# Patient Record
Sex: FEMALE | Race: WHITE | NOT HISPANIC OR LATINO | Employment: FULL TIME | URBAN - METROPOLITAN AREA
[De-identification: names, ages, dates, MRNs, and addresses within clinical notes are randomized per-mention and may not be internally consistent; named-entity substitution may affect disease eponyms.]

---

## 2019-11-20 ENCOUNTER — TELEPHONE (OUTPATIENT)
Dept: SCHEDULING | Facility: IMAGING CENTER | Age: 34
End: 2019-11-20

## 2019-12-31 ENCOUNTER — APPOINTMENT (OUTPATIENT)
Dept: MEDICAL GROUP | Facility: MEDICAL CENTER | Age: 34
End: 2019-12-31
Payer: COMMERCIAL

## 2020-01-24 ENCOUNTER — OFFICE VISIT (OUTPATIENT)
Dept: MEDICAL GROUP | Facility: IMAGING CENTER | Age: 35
End: 2020-01-24
Payer: COMMERCIAL

## 2020-01-24 VITALS
DIASTOLIC BLOOD PRESSURE: 70 MMHG | BODY MASS INDEX: 24.63 KG/M2 | TEMPERATURE: 98 F | HEIGHT: 63 IN | OXYGEN SATURATION: 99 % | HEART RATE: 84 BPM | SYSTOLIC BLOOD PRESSURE: 110 MMHG | RESPIRATION RATE: 12 BRPM | WEIGHT: 139 LBS

## 2020-01-24 DIAGNOSIS — Z13.220 SCREENING FOR HYPERLIPIDEMIA: ICD-10-CM

## 2020-01-24 DIAGNOSIS — Z80.8 FAMILY HISTORY OF THYROID CANCER: ICD-10-CM

## 2020-01-24 DIAGNOSIS — Z76.89 ENCOUNTER TO ESTABLISH CARE WITH NEW DOCTOR: ICD-10-CM

## 2020-01-24 DIAGNOSIS — Z13.1 SCREENING FOR DIABETES MELLITUS: ICD-10-CM

## 2020-01-24 DIAGNOSIS — F33.41 RECURRENT MAJOR DEPRESSIVE DISORDER, IN PARTIAL REMISSION (HCC): ICD-10-CM

## 2020-01-24 DIAGNOSIS — F43.29 STRESS AND ADJUSTMENT REACTION: Primary | ICD-10-CM

## 2020-01-24 DIAGNOSIS — Z13.0 SCREENING FOR DEFICIENCY ANEMIA: ICD-10-CM

## 2020-01-24 PROCEDURE — 99203 OFFICE O/P NEW LOW 30 MIN: CPT | Performed by: NURSE PRACTITIONER

## 2020-01-24 SDOH — HEALTH STABILITY: MENTAL HEALTH: HOW MANY STANDARD DRINKS CONTAINING ALCOHOL DO YOU HAVE ON A TYPICAL DAY?: 1 OR 2

## 2020-01-24 SDOH — HEALTH STABILITY: MENTAL HEALTH
STRESS IS WHEN SOMEONE FEELS TENSE, NERVOUS, ANXIOUS, OR CAN'T SLEEP AT NIGHT BECAUSE THEIR MIND IS TROUBLED. HOW STRESSED ARE YOU?: RATHER MUCH

## 2020-01-24 SDOH — HEALTH STABILITY: PHYSICAL HEALTH: ON AVERAGE, HOW MANY MINUTES DO YOU ENGAGE IN EXERCISE AT THIS LEVEL?: 40 MIN

## 2020-01-24 SDOH — HEALTH STABILITY: MENTAL HEALTH: HOW OFTEN DO YOU HAVE 6 OR MORE DRINKS ON ONE OCCASION?: LESS THAN MONTHLY

## 2020-01-24 SDOH — HEALTH STABILITY: MENTAL HEALTH: HOW OFTEN DO YOU HAVE A DRINK CONTAINING ALCOHOL?: 4 OR MORE TIMES A WEEK

## 2020-01-24 SDOH — HEALTH STABILITY: PHYSICAL HEALTH: ON AVERAGE, HOW MANY DAYS PER WEEK DO YOU ENGAGE IN MODERATE TO STRENUOUS EXERCISE (LIKE A BRISK WALK)?: 1 DAY

## 2020-01-24 ASSESSMENT — PAIN SCALES - GENERAL: PAINLEVEL: NO PAIN

## 2020-01-24 ASSESSMENT — PATIENT HEALTH QUESTIONNAIRE - PHQ9
CLINICAL INTERPRETATION OF PHQ2 SCORE: 2
SUM OF ALL RESPONSES TO PHQ QUESTIONS 1-9: 4
5. POOR APPETITE OR OVEREATING: 1 - SEVERAL DAYS

## 2020-01-26 PROBLEM — F43.29 STRESS AND ADJUSTMENT REACTION: Status: ACTIVE | Noted: 2020-01-26

## 2020-01-26 PROBLEM — F32.A DEPRESSION: Status: ACTIVE | Noted: 2020-01-26

## 2020-01-26 NOTE — ASSESSMENT & PLAN NOTE
States that she recently moved back to [place] from [place] with her fiance. States just started a new job that she is settling into and she is getting  in March 2020 in [place], [place] where her  is from. States that she experiencing stress that is related to moving, starting a new job, and planning her wedding. States she has been trying to increase her exercise level to manage stress. States that she walks her dog daily, and trying to get to the gym once a week.

## 2020-01-26 NOTE — PROGRESS NOTES
Subjective:   CC: Depression    HISTORY OF THE PRESENT ILLNESS: Patient is a 34 y.o. female. Her prior PCP was in [place], [place], uncertain the last time she saw provider.  Patient has history of depression and family thyroid cancer. Patient is here today to establish care and discuss overall health.     Depression  States she has a history of depression. States in the past she has been on Lexapro. States she lost her first  about 5 years ago, and this was when she started Lexapro. Denies depressive mood at this time that requires her to resume medication. Denies current counseling. Denies suicidal/homicidal thoughts at this time.     Stress and adjustment reaction  States that she recently moved back to [place] from [place] with her fiance. States just started a new job that she is settling into and she is getting  in March 2020 in [place], [place] where her  is from. States that she experiencing stress that is related to moving, starting a new job, and planning her wedding. States she has been trying to increase her exercise level to manage stress. States that she walks her dog daily, and trying to get to the gym once a week.     Depression Screening  Little interest or pleasure in doing things?  1 - several days   Feeling down, depressed , or hopeless? 1 - several days   Trouble falling or staying asleep, or sleeping too much?  0 - not at all   Feeling tired or having little energy?  0 - not at all   Poor appetite or overeating?  1 - several days   Feeling bad about yourself - or that you are a failure or have let yourself or your family down? 1 - several days   Trouble concentrating on things, such as reading the newspaper or watching television? 0 - not at all   Moving or speaking so slowly that other people could have noticed.  Or the opposite - being so fidgety or restless that you have been moving around a lot more than usual?  0 - not at all   Thoughts that you would be better off dead, or of  hurting yourself?  0 - not at all   Patient Health Questionnaire Score: 4     If depressive symptoms identified deferred to follow up visit unless specifically addressed in assesment and plan.    Interpretation of PHQ-9 Total Score   Score Severity   1-4 No Depression   5-9 Mild Depression   10-14 Moderate Depression   15-19 Moderately Severe Depression   20-27 Severe Depression    Allergies: Patient has no known allergies.    Current Outpatient Medications Ordered in Epic   Medication Sig Dispense Refill   • NON SPECIFIED      • Norgestim-Eth Estrad Triphasic (ORTHO TRI-CYCLEN, 28, PO) Take  by mouth.       No current Epic-ordered facility-administered medications on file.      Past Medical History:   Diagnosis Date   • Depression      History reviewed. No pertinent surgical history.    Social History     Tobacco Use   • Smoking status: Never Smoker   • Smokeless tobacco: Never Used   Substance Use Topics   • Alcohol use: Yes     Frequency: 4 or more times a week     Drinks per session: 1 or 2     Binge frequency: Less than monthly   • Drug use: Yes     Types: Marijuana, Inhaled, Oral     Social History     Patient does not qualify to have social determinant information on file (likely too young).   Social History Narrative   • Not on file     Family History   Problem Relation Age of Onset   • Cancer Mother         thyroid   • Cancer Paternal Aunt         breast   • Lung Disease Maternal Grandfather    • Stroke Paternal Grandmother      Health Maintenance: Completed.    ROS:   Constitutional: Denies fever, chills, night sweats, weight loss/gain or malaise/fatigue.   HENT: Denies nasal congestion, sore throat, hearing loss, enlarged thyroid, or difficulty swallowing.    Eyes: Denies changes in vision, pain.   Respiratory: Denies cough, SOB at rest or activity.    Cardiovascular: Denies tachycardia, chest pain, palpitations, or  leg swelling.   Gastrointestinal: Denies N/V/C/D, abdominal pain, loss appetite, reflux,  "or hematochezia.  Genitourinary: Denies difficulty voiding, dysuria, nocturia, or hematuria.   Skin: Negative for rash or worrisome moles.   Neurological: Negative for dizziness, focal weakness and headaches.   Endo/Heme/Allergies: Denies bruise/bleed easily, allergies.   Psychiatric/Behavioral: Denies nervous/anxious or difficulty sleeping. Hx of depression.     Objective:   Exam: /70   Pulse 84   Temp 36.7 °C (98 °F)   Resp 12   Ht 1.6 m (5' 3\")   Wt 63 kg (139 lb)   SpO2 99%  Body mass index is 24.62 kg/m².    General: Normal appearing. No distress.  HEENT: Normocephalic. Eyes conjunctiva clear lids without ptosis, PERRLA, ears normal shape and contour, canals are clear bilaterally, tympanic membranes pearly gray, intact, non-bulging, no drainage noted, no turbinate erythema, no polyps visible, oropharynx is without erythema, edema or exudates.  Teeth intact.  Neck: Supple without JVD or abnormal masses. Small soft mobile thyroid palpated, no nodules palpated, non-tender.  Pulmonary: Clear to ausculation.  Normal effort. No rales, ronchi, or wheezing.  Cardiovascular: Regular rate and rhythm without murmur. Pedal and radial pulses are intact and equal bilaterally.  Abdomen: Soft, nontender, nondistended. Normal bowel sounds. Liver and spleen are not palpable.  Lymph: No cervical or supraclavicular lymph nodes are palpable  Skin: Warm and dry.  No obvious lesions.  Musculoskeletal: Normal gait. No extremity cyanosis, clubbing, or edema.  Psych: Normal mood and affect. Alert and oriented x3. Judgment and insight is normal.    Assessment & Plan:   1. Encounter to establish care with new doctor  2. Screening for diabetes mellitus  3. Screening for hyperlipidemia  4. Screening for deficiency anemia  Reviewed with patient medication use and side effects. Medical, past, surgical history reviewed with patient. Discussed with patient the risk and benefits of receiving vaccines. Discussed CDC recommendations " for immunizations and USPSTF guidelines for screening exams.  Verbalized understanding. States she is UTD on vaccines, will provide proof in near future.  Encouraged patient to wash hands regularly and avoid sick contacts while supporting immune system with Vitamin C, Zinc, Elderberry, and garlic. Discussed preventive screening labs with patient, verbalized understanding.  Medical release signed for previous medical records.    - Comp Metabolic Panel; Future  - LIPID PANEL  - CBC WITH DIFFERENTIAL; Future    5. Family history of thyroid cancer  Discussed checking thyroid lab due to family history and patient request.   - TSH WITH REFLEX TO FT4; Future    6. Recurrent major depressive disorder, in partial remission (HCC)  7. Stress and adjustment reaction  This is a chronic stable condition.  Discussed with patient PHQ 9 results, verbalized understanding.  Discussed with patient continuing to manage depressive mood and ongoing stress with increase of mindfulness, meditation, increased exercise level.  Discussed setting small goals to be obtained either daily or weekly to manage stress that is associated with life factors.  Discussed with patient possible benefit of counseling, declined at this time but will consider for future if stress or depressive mood increases.  Discussed with patient decreasing alcohol intake, verbalized understanding and willingness.  Discussed seeking emergency services if she experiences worse symptoms.    Return in about 2 months (around 3/24/2020).    Please note that this dictation was created using voice recognition software. I have made every reasonable attempt to correct obvious errors, but I expect that there are errors of grammar and possibly content that I did not discover before finalizing the note.

## 2020-01-26 NOTE — ASSESSMENT & PLAN NOTE
States she has a history of depression. States in the past she has been on Lexapro. States she lost her first  about 5 years ago, and this was when she started Lexapro. Denies depressive mood at this time that requires her to resume medication. Denies current counseling. Denies suicidal/homicidal thoughts at this time.

## 2020-01-29 ENCOUNTER — HOSPITAL ENCOUNTER (OUTPATIENT)
Dept: LAB | Facility: MEDICAL CENTER | Age: 35
End: 2020-01-29
Attending: NURSE PRACTITIONER
Payer: COMMERCIAL

## 2020-01-29 DIAGNOSIS — Z13.1 SCREENING FOR DIABETES MELLITUS: ICD-10-CM

## 2020-01-29 DIAGNOSIS — Z80.8 FAMILY HISTORY OF THYROID CANCER: ICD-10-CM

## 2020-01-29 DIAGNOSIS — Z13.0 SCREENING FOR DEFICIENCY ANEMIA: ICD-10-CM

## 2020-01-29 LAB
ALBUMIN SERPL BCP-MCNC: 4.7 G/DL (ref 3.2–4.9)
ALBUMIN/GLOB SERPL: 1.6 G/DL
ALP SERPL-CCNC: 37 U/L (ref 30–99)
ALT SERPL-CCNC: 11 U/L (ref 2–50)
ANION GAP SERPL CALC-SCNC: 11 MMOL/L (ref 0–11.9)
AST SERPL-CCNC: 12 U/L (ref 12–45)
BASOPHILS # BLD AUTO: 2.2 % (ref 0–1.8)
BASOPHILS # BLD: 0.08 K/UL (ref 0–0.12)
BILIRUB SERPL-MCNC: 0.4 MG/DL (ref 0.1–1.5)
BUN SERPL-MCNC: 8 MG/DL (ref 8–22)
CALCIUM SERPL-MCNC: 9.8 MG/DL (ref 8.5–10.5)
CHLORIDE SERPL-SCNC: 105 MMOL/L (ref 96–112)
CHOLEST SERPL-MCNC: 173 MG/DL (ref 100–199)
CO2 SERPL-SCNC: 25 MMOL/L (ref 20–33)
CREAT SERPL-MCNC: 0.8 MG/DL (ref 0.5–1.4)
EOSINOPHIL # BLD AUTO: 0.06 K/UL (ref 0–0.51)
EOSINOPHIL NFR BLD: 1.7 % (ref 0–6.9)
ERYTHROCYTE [DISTWIDTH] IN BLOOD BY AUTOMATED COUNT: 47.3 FL (ref 35.9–50)
GLOBULIN SER CALC-MCNC: 2.9 G/DL (ref 1.9–3.5)
GLUCOSE SERPL-MCNC: 94 MG/DL (ref 65–99)
HCT VFR BLD AUTO: 43.7 % (ref 37–47)
HDLC SERPL-MCNC: 82 MG/DL
HGB BLD-MCNC: 13.6 G/DL (ref 12–16)
IMM GRANULOCYTES # BLD AUTO: 0.01 K/UL (ref 0–0.11)
IMM GRANULOCYTES NFR BLD AUTO: 0.3 % (ref 0–0.9)
LDLC SERPL CALC-MCNC: 79 MG/DL
LYMPHOCYTES # BLD AUTO: 1.5 K/UL (ref 1–4.8)
LYMPHOCYTES NFR BLD: 41.8 % (ref 22–41)
MCH RBC QN AUTO: 31.9 PG (ref 27–33)
MCHC RBC AUTO-ENTMCNC: 31.1 G/DL (ref 33.6–35)
MCV RBC AUTO: 102.3 FL (ref 81.4–97.8)
MONOCYTES # BLD AUTO: 0.28 K/UL (ref 0–0.85)
MONOCYTES NFR BLD AUTO: 7.8 % (ref 0–13.4)
NEUTROPHILS # BLD AUTO: 1.66 K/UL (ref 2–7.15)
NEUTROPHILS NFR BLD: 46.2 % (ref 44–72)
NRBC # BLD AUTO: 0 K/UL
NRBC BLD-RTO: 0 /100 WBC
PLATELET # BLD AUTO: 277 K/UL (ref 164–446)
PMV BLD AUTO: 10.3 FL (ref 9–12.9)
POTASSIUM SERPL-SCNC: 4.1 MMOL/L (ref 3.6–5.5)
PROT SERPL-MCNC: 7.6 G/DL (ref 6–8.2)
RBC # BLD AUTO: 4.27 M/UL (ref 4.2–5.4)
SODIUM SERPL-SCNC: 141 MMOL/L (ref 135–145)
TRIGL SERPL-MCNC: 59 MG/DL (ref 0–149)
WBC # BLD AUTO: 3.6 K/UL (ref 4.8–10.8)

## 2020-01-29 PROCEDURE — 80053 COMPREHEN METABOLIC PANEL: CPT

## 2020-01-29 PROCEDURE — 85025 COMPLETE CBC W/AUTO DIFF WBC: CPT

## 2020-01-29 PROCEDURE — 80061 LIPID PANEL: CPT

## 2020-01-29 PROCEDURE — 84443 ASSAY THYROID STIM HORMONE: CPT

## 2020-01-29 PROCEDURE — 36415 COLL VENOUS BLD VENIPUNCTURE: CPT

## 2020-01-30 LAB — TSH SERPL DL<=0.005 MIU/L-ACNC: 1.26 UIU/ML (ref 0.38–5.33)

## 2020-01-31 DIAGNOSIS — D75.89 MACROCYTOSIS WITHOUT ANEMIA: ICD-10-CM

## 2020-05-29 ENCOUNTER — HOSPITAL ENCOUNTER (OUTPATIENT)
Dept: LAB | Facility: MEDICAL CENTER | Age: 35
End: 2020-05-29
Attending: NURSE PRACTITIONER
Payer: COMMERCIAL

## 2020-05-29 ENCOUNTER — OFFICE VISIT (OUTPATIENT)
Dept: MEDICAL GROUP | Facility: IMAGING CENTER | Age: 35
End: 2020-05-29
Payer: COMMERCIAL

## 2020-05-29 VITALS
WEIGHT: 135 LBS | OXYGEN SATURATION: 97 % | TEMPERATURE: 98.3 F | HEART RATE: 67 BPM | RESPIRATION RATE: 12 BRPM | SYSTOLIC BLOOD PRESSURE: 112 MMHG | BODY MASS INDEX: 23.92 KG/M2 | DIASTOLIC BLOOD PRESSURE: 72 MMHG | HEIGHT: 63 IN

## 2020-05-29 DIAGNOSIS — R10.31 RIGHT LOWER QUADRANT ABDOMINAL PAIN: ICD-10-CM

## 2020-05-29 DIAGNOSIS — Z30.431 ENCOUNTER FOR MANAGEMENT OF INTRAUTERINE CONTRACEPTIVE DEVICE (IUD), UNSPECIFIED IUD MANAGEMENT TYPE: ICD-10-CM

## 2020-05-29 DIAGNOSIS — Z30.430 ENCOUNTER FOR INSERTION OF INTRAUTERINE CONTRACEPTIVE DEVICE (IUD): ICD-10-CM

## 2020-05-29 LAB
BASOPHILS # BLD AUTO: 0.6 % (ref 0–1.8)
BASOPHILS # BLD: 0.05 K/UL (ref 0–0.12)
EOSINOPHIL # BLD AUTO: 0.05 K/UL (ref 0–0.51)
EOSINOPHIL NFR BLD: 0.6 % (ref 0–6.9)
ERYTHROCYTE [DISTWIDTH] IN BLOOD BY AUTOMATED COUNT: 42.8 FL (ref 35.9–50)
HCT VFR BLD AUTO: 39.7 % (ref 37–47)
HGB BLD-MCNC: 13.2 G/DL (ref 12–16)
IMM GRANULOCYTES # BLD AUTO: 0.01 K/UL (ref 0–0.11)
IMM GRANULOCYTES NFR BLD AUTO: 0.1 % (ref 0–0.9)
LYMPHOCYTES # BLD AUTO: 1.82 K/UL (ref 1–4.8)
LYMPHOCYTES NFR BLD: 22.5 % (ref 22–41)
MCH RBC QN AUTO: 31.9 PG (ref 27–33)
MCHC RBC AUTO-ENTMCNC: 33.2 G/DL (ref 33.6–35)
MCV RBC AUTO: 95.9 FL (ref 81.4–97.8)
MONOCYTES # BLD AUTO: 0.47 K/UL (ref 0–0.85)
MONOCYTES NFR BLD AUTO: 5.8 % (ref 0–13.4)
NEUTROPHILS # BLD AUTO: 5.69 K/UL (ref 2–7.15)
NEUTROPHILS NFR BLD: 70.4 % (ref 44–72)
NRBC # BLD AUTO: 0 K/UL
NRBC BLD-RTO: 0 /100 WBC
PLATELET # BLD AUTO: 309 K/UL (ref 164–446)
PMV BLD AUTO: 10.8 FL (ref 9–12.9)
RBC # BLD AUTO: 4.14 M/UL (ref 4.2–5.4)
WBC # BLD AUTO: 8.1 K/UL (ref 4.8–10.8)

## 2020-05-29 PROCEDURE — 99213 OFFICE O/P EST LOW 20 MIN: CPT | Performed by: NURSE PRACTITIONER

## 2020-05-29 PROCEDURE — 85025 COMPLETE CBC W/AUTO DIFF WBC: CPT

## 2020-05-29 PROCEDURE — 84703 CHORIONIC GONADOTROPIN ASSAY: CPT

## 2020-05-29 PROCEDURE — 36415 COLL VENOUS BLD VENIPUNCTURE: CPT

## 2020-05-29 ASSESSMENT — FIBROSIS 4 INDEX: FIB4 SCORE: 0.46

## 2020-05-29 ASSESSMENT — PAIN SCALES - GENERAL: PAINLEVEL: 6=MODERATE PAIN

## 2020-05-30 LAB — HCG SERPL QL: NEGATIVE

## 2020-06-01 ENCOUNTER — HOSPITAL ENCOUNTER (OUTPATIENT)
Dept: RADIOLOGY | Facility: MEDICAL CENTER | Age: 35
End: 2020-06-01
Attending: NURSE PRACTITIONER
Payer: COMMERCIAL

## 2020-06-01 DIAGNOSIS — R10.31 RIGHT LOWER QUADRANT ABDOMINAL PAIN: ICD-10-CM

## 2020-06-01 PROCEDURE — 76830 TRANSVAGINAL US NON-OB: CPT

## 2020-06-01 NOTE — PROGRESS NOTES
Subjective:   CC: Pain (side pain. pain scale 6/10. constant pain. taknig aleve for pain.) and Other (no ob/gyn. wnats to get off BC and possibly get an IUD. )    HPI:   Lissette presents today with ongoing intermittent RLQ pain    States on May 20th she started to have intermittent RLQ pain that wraps around hip to lower back that is relieved with OTC Aleve. States that pain is dull and achy to sharp at times. Denies any UTI symptoms, N/V/C/D, fever, body aches, and/or chills at this time. Denies history of ectopic pregnancy. States she take her oral birth control everyday and does not believe she could be pregnant due to regular use of birth control. States she has been on her birth control for many years without complication. Denies any injury or new exercise regimen that could cause pain. States her gallbladder and appendix remained intact.  Denies pain being associated with eating.  States that she should start her menstrual cycle this coming Sunday.     Past Medical History:   Diagnosis Date   • Depression      Social History     Tobacco Use   • Smoking status: Never Smoker   • Smokeless tobacco: Never Used   Substance Use Topics   • Alcohol use: Yes     Frequency: 4 or more times a week     Drinks per session: 1 or 2     Binge frequency: Less than monthly   • Drug use: Yes     Types: Marijuana, Inhaled, Oral     Current Outpatient Medications Ordered in Epic   Medication Sig Dispense Refill   • NON SPECIFIED      • Norgestim-Eth Estrad Triphasic (ORTHO TRI-CYCLEN, 28, PO) Take  by mouth.       No current Epic-ordered facility-administered medications on file.      Allergies:  Patient has no known allergies.    ROS:  Constitutional: Denies fever, chills, night sweats, weight loss/gain or malaise/fatigue.   HENT: Denies nasal congestion, sore throat, hearing loss, enlarged thyroid, or difficulty swallowing.    Eyes: Denies changes in vision, pain.   Respiratory: Denies cough, SOB at rest or activity.   "  Cardiovascular: Denies tachycardia, chest pain, palpitations, or  leg swelling.   Gastrointestinal: Denies N/V/C/D, loss appetite, reflux, or hematochezia. Intermittent RLQ pain, see HPI.  Genitourinary: Denies difficulty voiding, dysuria, nocturia, or hematuria.   Skin: Negative for rash or worrisome moles.   Neurological: Negative for dizziness, focal weakness and headaches.   Endo/Heme/Allergies: Denies bruise/bleed easily, allergies.   Psychiatric/Behavioral: Denies nervous/anxious or difficulty sleeping. Hx of depression.    Objective:   Exam:  /72 (BP Location: Right arm, Patient Position: Sitting, BP Cuff Size: Adult)   Pulse 67   Temp 36.8 °C (98.3 °F) (Temporal)   Resp 12   Ht 1.6 m (5' 3\")   Wt 61.2 kg (135 lb)   SpO2 97%   BMI 23.91 kg/m²  Body mass index is 23.91 kg/m².  General: Normal appearing. No distress.  HEENT: Normocephalic. Eyes conjunctiva clear lids without ptosis, PERRLA, ears normal shape and contour. Patient wore a mask during visit.  Neck: Trachea midline, no masses, no thyromegaly.  Pulmonary: Clear to ausculation.  Normal effort. No rales, ronchi, or wheezing.  Cardiovascular: Regular rate and rhythm without murmur. Pedal and radial pulses are intact and equal bilaterally.  Abdomen: Soft, nondistended. Normal bowel sounds. Liver and spleen are not palpable. Mild tenderness with light palpation of right lower quadrant. Negative Rovsing's sign, McBurney's Point, and Psoas sign.  Neurologic: Grossly non-focal.  Skin: Warm and dry. No obvious lesions.  Musculoskeletal: Normal gait. No extremity cyanosis, clubbing, or edema.  Psych: Normal mood and affect. Alert and oriented x3. Judgment and insight is normal.    Assessment & Plan:   1. Encounter for management of intrauterine contraceptive device (IUD), unspecified IUD management type  Discussed referral to OB/GYN for counseling of IUD as form of birthcontrol, verbalized understanding and willingness.    - REFERRAL TO " OB/GYN    2. Right lower quadrant abdominal pain  This is a stable condition. Discussed with patient that physical exam is negative for acute appendicitis at this time. Discussed symptoms of appendicitis and ectopic pregnancy. Discussed the importance of ruling out ectopic pregnancy, verbalized understanding. Discussed with patient obtaining ultrasound of abdomen and transvaginal ultrasound to evaluate further for RLQ pain, verbalized understanding. Discussed with patient that she could be experiencing an ovarian cyst due to ongoing hormone birth control use. Instructed to continue to use Aleve as tolerated for pain relief. Instructed to take with food to decrease GI upset and bleeding. Instructed to use warm compress or heating pad to relieve pain.  Discussed seeking emergency services if she experiences worse symptoms or pain becomes constant. Instructed to obtain labs for further evaluation, verbalized understanding and willingness. Will evaluate plan of care once labs and ultrasounds are obtained.    - US-PELVIC TRANSVAGINAL ONLY; Future  - US-ABDOMEN COMPLETE SURVEY; Future  - CBC WITH DIFFERENTIAL; Future  -HCG Qual Serum    Patient was seen for 15 minutes face to face of which, at least 50% of the time was spent counseling regarding above.    Return if symptoms worsen or fail to improve. Pending labs and U/S results.    Please note that this dictation was created using voice recognition software. I have made every reasonable attempt to correct obvious errors, but I expect that there are errors of grammar and possibly content that I did not discover before finalizing the note.

## 2020-06-03 ENCOUNTER — HOSPITAL ENCOUNTER (OUTPATIENT)
Dept: RADIOLOGY | Facility: MEDICAL CENTER | Age: 35
End: 2020-06-03
Attending: NURSE PRACTITIONER
Payer: COMMERCIAL

## 2020-06-03 DIAGNOSIS — R10.31 RIGHT LOWER QUADRANT ABDOMINAL PAIN: ICD-10-CM

## 2020-06-03 PROCEDURE — 76700 US EXAM ABDOM COMPLETE: CPT

## 2020-08-28 ENCOUNTER — OFFICE VISIT (OUTPATIENT)
Dept: MEDICAL GROUP | Facility: IMAGING CENTER | Age: 35
End: 2020-08-28
Payer: COMMERCIAL

## 2020-08-28 VITALS
RESPIRATION RATE: 12 BRPM | HEART RATE: 68 BPM | WEIGHT: 130 LBS | BODY MASS INDEX: 23.04 KG/M2 | TEMPERATURE: 98.5 F | DIASTOLIC BLOOD PRESSURE: 80 MMHG | HEIGHT: 63 IN | OXYGEN SATURATION: 100 % | SYSTOLIC BLOOD PRESSURE: 122 MMHG

## 2020-08-28 DIAGNOSIS — Z34.90 ENCOUNTER FOR PRENATAL CARE: ICD-10-CM

## 2020-08-28 DIAGNOSIS — Z32.01 POSITIVE URINE PREGNANCY TEST: ICD-10-CM

## 2020-08-28 LAB
INT CON NEG: NEGATIVE
INT CON POS: POSITIVE
POC URINE PREGNANCY TEST: POSITIVE

## 2020-08-28 PROCEDURE — 81025 URINE PREGNANCY TEST: CPT | Performed by: NURSE PRACTITIONER

## 2020-08-28 PROCEDURE — 99213 OFFICE O/P EST LOW 20 MIN: CPT | Performed by: NURSE PRACTITIONER

## 2020-08-28 RX ORDER — M-VIT,TX,IRON,MINS/CALC/FOLIC 27MG-0.4MG
1 TABLET ORAL DAILY
COMMUNITY
End: 2020-10-06

## 2020-08-28 ASSESSMENT — PAIN SCALES - GENERAL: PAINLEVEL: 2=MINIMAL-SLIGHT

## 2020-08-28 ASSESSMENT — FIBROSIS 4 INDEX: FIB4 SCORE: 0.42

## 2020-08-28 NOTE — PROGRESS NOTES
Subjective:   CC: Other (Positive pregnancy test at home. )    HPI:   Lissette presents today to discuss positive pregnancy test.    States she recently took a urine pregnancy test at home and it was positive. States she recently went to her GYN to discuss switching to an IUD for birth control, but decided to not switch, and actually get off birth control to try having a baby with her . States her LMP was July 22nd. [facility] she contacted her established doctor, Dr. Washburn, but was told he does not do delivers. [facility] she has an appointment with a new doctor in mid September at [facility]. States she is excited and didn't think she would get pregnancy so quickly. States she drank alcohol the weekend before knowing she was pregnant and is concerned this has affected her pregnancy. Denies drinking heavily during the weekend. States her mother has Factor Leiden 5 and she is a carrier. Denies history of DVT.    Past Medical History:   Diagnosis Date   • Depression      Social History     Tobacco Use   • Smoking status: Never Smoker   • Smokeless tobacco: Never Used   Substance Use Topics   • Alcohol use: Yes     Frequency: 4 or more times a week     Drinks per session: 1 or 2     Binge frequency: Less than monthly   • Drug use: Yes     Types: Marijuana, Inhaled, Oral     Current Outpatient Medications Ordered in Epic   Medication Sig Dispense Refill   • therapeutic multivitamin-minerals (THERAGRAN-M) Tab Take 1 Tab by mouth every day.     • NON SPECIFIED        No current Epic-ordered facility-administered medications on file.      Allergies:  Patient has no known allergies.    Health Maintenance: Completed.    ROS:  Constitutional: Denies fever, chills, night sweats, weight loss/gain or malaise/fatigue.   HENT: Denies nasal congestion, sore throat, hearing loss, enlarged thyroid, or difficulty swallowing.    Eyes: Denies changes in vision, pain.   Respiratory: Denies cough, SOB at rest or activity.  "   Cardiovascular: Denies tachycardia, chest pain, palpitations, or  leg swelling.   Gastrointestinal: Denies N/V/C/D,abdomen pain, loss appetite, reflux, or hematochezia.   Genitourinary: Denies difficulty voiding, dysuria, nocturia, or hematuria.   Skin: Negative for rash or worrisome moles.   Neurological: Negative for dizziness, focal weakness and headaches.   Endo/Heme/Allergies: Denies bruise/bleed easily, allergies.   Psychiatric/Behavioral: Denies nervous/anxious or difficulty sleeping. Hx of depression.    Objective:   Exam:  /80 (BP Location: Right arm, Patient Position: Sitting, BP Cuff Size: Adult)   Pulse 68   Temp 36.9 °C (98.5 °F) (Temporal)   Resp 12   Ht 1.6 m (5' 3\")   Wt 59 kg (130 lb)   LMP 07/22/2020   SpO2 100%   BMI 23.03 kg/m²  Body mass index is 23.03 kg/m².  General: Normal appearing. No distress.  HEENT: Normocephalic. Eyes conjunctiva clear lids without ptosis, PERRLA, ears normal shape and contour. Patient wore a mask during visit.  Neck: Trachea midline, no masses, no thyromegaly.  Pulmonary:  Unlabored respiratory effort, no cough.  Neurologic: Grossly non-focal.  Skin: Warm and dry. No obvious lesions.  Musculoskeletal: Normal gait. No extremity cyanosis, clubbing, or edema.  Psych: Normal mood and affect. Alert and oriented x3. Judgment and insight is normal.    Labs: POCT urine pregnancy test: positive.    Assessment & Plan:   1. Encounter for prenatal care  2. Positive urine pregnancy test  This is a stable condition. 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 Discussed PCP reaching out to OB/GYN to assess if patient needs to be on preventive medication to prevent clotting, verbalized understanding. Discussed with patient changing to prenatal vitamin instead of multiple vitamin. Discussed the benefit of 400 mcg of Folic Acid growing fetus. Verbalized understanding and willingness to change. " Discussed maintain hydration with water and maintain her current activity level and exercise regimen. Discussed avoiding any vigorous exercising, verbalized understanding. Discussed with patient not drinking alcohol and avoid cigarette smoke, verbalized understanding. Encouraged patient to wash hands regularly and avoid sick contacts while supporting immune system.  Discussed appropriate social distancing and isolation with patient to decrease risk of COVID-19, verbalized understanding.  Instructed to wear mask when in public places, verbalized understanding. Discussed before starting any medication reaching out to PCP and/or OB/GYN to verify if it is appropriate. Discussed if she experiences morning sick to attempt to snack often to avoid symptoms. Discussed if she experiences constipation starting OTC colace 100-200 mg, verbalized understanding. Discussed with patient maintaining established care appointment with new OB/GYN. Discussed with reaching out to PCP if she has any further questions before being able to establish care with OB, verbalized understanding.     -     POCT Pregnancy    Return As needed and/or has any question until she is available to establish care with OB.    Please note that this dictation was created using voice recognition software. I have made every reasonable attempt to correct obvious errors, but I expect that there are errors of grammar and possibly content that I did not discover before finalizing the note.

## 2020-09-22 ENCOUNTER — HOSPITAL ENCOUNTER (OUTPATIENT)
Facility: MEDICAL CENTER | Age: 35
End: 2020-09-22
Attending: OBSTETRICS & GYNECOLOGY
Payer: COMMERCIAL

## 2020-09-22 LAB
ABO GROUP BLD: NORMAL
RH BLD: NORMAL

## 2020-09-22 PROCEDURE — 86900 BLOOD TYPING SEROLOGIC ABO: CPT

## 2020-09-22 PROCEDURE — 86901 BLOOD TYPING SEROLOGIC RH(D): CPT

## 2020-10-06 ENCOUNTER — HOSPITAL ENCOUNTER (EMERGENCY)
Facility: MEDICAL CENTER | Age: 35
End: 2020-10-06
Attending: EMERGENCY MEDICINE
Payer: COMMERCIAL

## 2020-10-06 ENCOUNTER — APPOINTMENT (OUTPATIENT)
Dept: RADIOLOGY | Facility: MEDICAL CENTER | Age: 35
End: 2020-10-06
Attending: EMERGENCY MEDICINE
Payer: COMMERCIAL

## 2020-10-06 VITALS
OXYGEN SATURATION: 99 % | HEIGHT: 63 IN | RESPIRATION RATE: 18 BRPM | TEMPERATURE: 98.9 F | SYSTOLIC BLOOD PRESSURE: 118 MMHG | WEIGHT: 132.72 LBS | BODY MASS INDEX: 23.52 KG/M2 | DIASTOLIC BLOOD PRESSURE: 70 MMHG | HEART RATE: 82 BPM

## 2020-10-06 DIAGNOSIS — O03.4 INCOMPLETE MISCARRIAGE: ICD-10-CM

## 2020-10-06 LAB
ANION GAP SERPL CALC-SCNC: 14 MMOL/L (ref 7–16)
B-HCG SERPL-ACNC: 9890 MIU/ML (ref 0–10)
BASOPHILS # BLD AUTO: 1 % (ref 0–1.8)
BASOPHILS # BLD: 0.06 K/UL (ref 0–0.12)
BUN SERPL-MCNC: 8 MG/DL (ref 8–22)
CALCIUM SERPL-MCNC: 9.7 MG/DL (ref 8.4–10.2)
CHLORIDE SERPL-SCNC: 104 MMOL/L (ref 96–112)
CO2 SERPL-SCNC: 21 MMOL/L (ref 20–33)
CREAT SERPL-MCNC: 0.55 MG/DL (ref 0.5–1.4)
EOSINOPHIL # BLD AUTO: 0.09 K/UL (ref 0–0.51)
EOSINOPHIL NFR BLD: 1.4 % (ref 0–6.9)
ERYTHROCYTE [DISTWIDTH] IN BLOOD BY AUTOMATED COUNT: 40.7 FL (ref 35.9–50)
GLUCOSE SERPL-MCNC: 101 MG/DL (ref 65–99)
HCT VFR BLD AUTO: 37.3 % (ref 37–47)
HGB BLD-MCNC: 12.8 G/DL (ref 12–16)
IMM GRANULOCYTES # BLD AUTO: 0.01 K/UL (ref 0–0.11)
IMM GRANULOCYTES NFR BLD AUTO: 0.2 % (ref 0–0.9)
LYMPHOCYTES # BLD AUTO: 1.47 K/UL (ref 1–4.8)
LYMPHOCYTES NFR BLD: 23.4 % (ref 22–41)
MCH RBC QN AUTO: 30.8 PG (ref 27–33)
MCHC RBC AUTO-ENTMCNC: 34.3 G/DL (ref 33.6–35)
MCV RBC AUTO: 89.9 FL (ref 81.4–97.8)
MONOCYTES # BLD AUTO: 0.42 K/UL (ref 0–0.85)
MONOCYTES NFR BLD AUTO: 6.7 % (ref 0–13.4)
NEUTROPHILS # BLD AUTO: 4.22 K/UL (ref 2–7.15)
NEUTROPHILS NFR BLD: 67.3 % (ref 44–72)
NRBC # BLD AUTO: 0 K/UL
NRBC BLD-RTO: 0 /100 WBC
PLATELET # BLD AUTO: 323 K/UL (ref 164–446)
PMV BLD AUTO: 9.4 FL (ref 9–12.9)
POTASSIUM SERPL-SCNC: 3.9 MMOL/L (ref 3.6–5.5)
RBC # BLD AUTO: 4.15 M/UL (ref 4.2–5.4)
SODIUM SERPL-SCNC: 139 MMOL/L (ref 135–145)
WBC # BLD AUTO: 6.3 K/UL (ref 4.8–10.8)

## 2020-10-06 PROCEDURE — 85025 COMPLETE CBC W/AUTO DIFF WBC: CPT

## 2020-10-06 PROCEDURE — 99284 EMERGENCY DEPT VISIT MOD MDM: CPT

## 2020-10-06 PROCEDURE — 76801 OB US < 14 WKS SINGLE FETUS: CPT

## 2020-10-06 PROCEDURE — 80048 BASIC METABOLIC PNL TOTAL CA: CPT

## 2020-10-06 PROCEDURE — 84702 CHORIONIC GONADOTROPIN TEST: CPT

## 2020-10-06 PROCEDURE — 36415 COLL VENOUS BLD VENIPUNCTURE: CPT

## 2020-10-06 ASSESSMENT — FIBROSIS 4 INDEX: FIB4 SCORE: 0.42

## 2020-10-06 NOTE — ED NOTES
Pt given d/c paperwork and f/u instruction, pt verbalized understanding all information given. Pt ambulated out the ER w/o difficulty w/ family.

## 2020-10-06 NOTE — ED TRIAGE NOTES
"Chief Complaint   Patient presents with   • Vaginal Bleeding     started this am, 3 pads this am   • Pelvic Pain   • Pregnancy     Pt reports is 6 weeks pregnant     /84   Pulse 87   Temp 37.2 °C (98.9 °F) (Temporal)   Resp 18   Ht 1.6 m (5' 3\")   Wt 60.2 kg (132 lb 11.5 oz)   LMP 07/22/2020   SpO2 99%   BMI 23.51 kg/m²     Covid Screen Negative    "

## 2020-10-06 NOTE — ED NOTES
Pelvic exam performed by ERP w/ female ED Tech at bedside.  Privacy provided throughout procedure.  Pt provided w/ warm blankets.

## 2020-10-06 NOTE — Clinical Note
Amanda Fleiner was seen and treated in our emergency department on 10/6/2020.  She may return to work on 10/07/2020.       If you have any questions or concerns, please don't hesitate to call.      Nick Garcia M.D.

## 2020-10-06 NOTE — ED PROVIDER NOTES
ED Provider Note      Means of Arrival: Private vehicle  History obtained from: Patient, medical record      CHIEF COMPLAINT  Chief Complaint   Patient presents with   • Vaginal Bleeding     started this am, 3 pads this am   • Pelvic Pain   • Pregnancy     Pt reports is 6 weeks pregnant       HPI  Amanda Jeanne Fleiner is a 35 y.o. female who presents with vaginal bleeding, abdominal cramping.  The patient is G1, P0 at approximately 6 weeks.  She sees Dr. Gudino for OB/GYN.  She has had several ultrasounds concerning for threatened miscarriage.  She has a prescription available for misoprostol but has not used it.  She began having increased vaginal bleeding and abdominal cramping this morning, which caused her concern.  She denies any lightheadedness, dysuria, hematuria, chest pain, shortness of breath, fevers.    REVIEW OF SYSTEMS  CONSTITUTIONAL:  No fever.  CARDIOVASCULAR:  No chest discomfort.  RESPIRATORY:  No pleuritic chest pain.  GASTROINTESTINAL:   See HPI  GENITOURINARY:   No dysuria.  MUSCULOSKELETAL:  No arthralgia.  SKIN:  No rash or suspicious lesions.  NEUROLOGIC:   No headache.  ENDOCRINE:  No facial edema.  HEMATOLOGIC:  No abnormal bleeding.       See HPI for further details.   All other systems are negative.     PAST MEDICAL HISTORY  Past Medical History:   Diagnosis Date   • Depression        FAMILY HISTORY  Family History   Problem Relation Age of Onset   • Cancer Mother         thyroid   • Cancer Paternal Aunt         breast   • Lung Disease Maternal Grandfather    • Stroke Paternal Grandmother        SOCIAL HISTORY   reports that she has never smoked. She has never used smokeless tobacco. She reports current alcohol use. She reports current drug use. Drugs: Marijuana, Inhaled, and Oral.    SURGICAL HISTORY  History reviewed. No pertinent surgical history.    CURRENT MEDICATIONS  Home Medications     Reviewed by Nolan Rosdao (Pharmacy Tech) on 10/06/20 at 1131  Med List Status:  "Complete   Medication Last Dose Status        Patient Javier Taking any Medications                       ALLERGIES  No Known Allergies    PHYSICAL EXAM  VITAL SIGNS: /62   Pulse 70   Temp 37.2 °C (98.9 °F) (Temporal)   Resp 18   Ht 1.6 m (5' 3\")   Wt 60.2 kg (132 lb 11.5 oz)   LMP 2020   SpO2 97%   BMI 23.51 kg/m²    Gen: Alert  HENT: ATNC  Eyes: Normal conjunctiva  Neck: trachea midline  Resp: no respiratory distress  CV: No JVD, regular rate and rhythm  Abd: non-distended, nontender  : No CVA tenderness  Pelvic: Open cervical loss with clot present.  No active bleeding.  Ext: No deformities  Psych: normal mood  Neuro: speech fluent       RADIOLOGY/PROCEDURES  US-OB 1ST TRIMESTER WITH TRANSVAGINAL (COMBO)   Final Result      Viable single intrauterine gestation of an estimated  in progress with dysmorphic gestational sac positioned in the endocervical canal.      No sonographic evidence of ovarian torsion          LABS  Labs Reviewed   CBC WITH DIFFERENTIAL - Abnormal; Notable for the following components:       Result Value    RBC 4.15 (*)     All other components within normal limits   BASIC METABOLIC PANEL - Abnormal; Notable for the following components:    Glucose 101 (*)     All other components within normal limits   HCG QUANTITATIVE - Abnormal; Notable for the following components:    Bhcg 9890.0 (*)     All other components within normal limits   ESTIMATED GFR          COURSE & MEDICAL DECISION MAKING  Pertinent Labs & Imaging studies reviewed. (See chart for details)    Patient resents with inevitable miscarriage.  She has an open office with bleeding.  Ultrasound shows that she is still in the process of this.  Patient was counseled on return precautions, to [facility] guidance.  No evidence of septic .  She is advised to fill her misoprostol and use this at home.  She is otherwise well-appearing.  No evidence of anemia.  No evidence of significant blood loss.  Review " of the patient's medical record demonstrates that she is Rh+.    Appropriate PPE were worn at this encounter.     FINAL IMPRESSION  1. Incomplete miscarriage             DISPOSITION:  Patient will be discharged home in stable condition.    FOLLOW UP:  Jody Gudino M.D.  [address]  [address]  [place] [id number] [phone]    Call       [facility], Emergency Dept  [address]  [place] [address] [phone]    If symptoms worsen      OUTPATIENT MEDICATIONS:  New Prescriptions    No medications on file

## 2020-10-06 NOTE — ED NOTES
Med Rec complete per Pt at bedside  Allergies Reviewed  No ABX in the last 14 days    Pt takes no medications

## 2020-10-06 NOTE — DISCHARGE INSTRUCTIONS
You are seen in the emergency department for vaginal bleeding in the setting of miscarriage.    Your blood work is reassuring.  Your ultrasound shows that you are still in the process of miscarrying.  We recommend that you  your misoprostol and use that.    Please follow-up with your OB/GYN.    For pain you can take ibuprofen (Motrin), 600mg every 6 hours as needed for pain (take with food to avoid GI upset). You can also take acetaminophen (Tylenol), 1000mg every 8 hours as needed for pain. Do not take more than 3000mg of acetaminophen in any 24 hour period.  Taking these medications regularly during the day can be very effective in controlling pain.    Please return to the emergency department or seek medical attention if you develop:  Fevers, excessive pain, soaking more than 2 pads an hour for several hours, lightheadedness, or any other new or concerning findings    ================================  Coronavirus Information    Your visit did NOT appear to relate to coronavirus, but if you or your family develop symptoms that concern you for coronavirus (please see CDC website for symptoms), please contact the [facility] [place], [place] hotline (or your local health department)  or your healthcare provider before going to a medical facility:    [facility] [place] [place]  24hr hotline: [phone]    Information is available from the Centers for Disease Control and Prevention  www.CDC.gov    and     [facility] [place] [place]  [email or web address]    If you are severely ill or having a hard time breathing, please immediatly seek medical care. Notify the  or Emergency Department Triage about your symptoms.

## 2020-10-16 ENCOUNTER — IMMUNIZATION (OUTPATIENT)
Dept: SOCIAL WORK | Facility: CLINIC | Age: 35
End: 2020-10-16
Payer: COMMERCIAL

## 2020-10-16 DIAGNOSIS — Z23 NEED FOR VACCINATION: Primary | ICD-10-CM

## 2020-10-16 PROCEDURE — 90686 IIV4 VACC NO PRSV 0.5 ML IM: CPT | Performed by: REGISTERED NURSE

## 2020-10-16 PROCEDURE — 90471 IMMUNIZATION ADMIN: CPT | Performed by: REGISTERED NURSE

## 2021-04-16 ENCOUNTER — OFFICE VISIT (OUTPATIENT)
Dept: MEDICAL GROUP | Facility: IMAGING CENTER | Age: 36
End: 2021-04-16
Payer: COMMERCIAL

## 2021-04-16 VITALS
HEIGHT: 63 IN | OXYGEN SATURATION: 98 % | HEART RATE: 80 BPM | WEIGHT: 140 LBS | BODY MASS INDEX: 24.8 KG/M2 | RESPIRATION RATE: 12 BRPM | SYSTOLIC BLOOD PRESSURE: 100 MMHG | TEMPERATURE: 98.1 F | DIASTOLIC BLOOD PRESSURE: 64 MMHG

## 2021-04-16 DIAGNOSIS — F41.9 ANXIETY: ICD-10-CM

## 2021-04-16 DIAGNOSIS — F33.1 MODERATE EPISODE OF RECURRENT MAJOR DEPRESSIVE DISORDER (HCC): ICD-10-CM

## 2021-04-16 PROBLEM — M76.32 ILIOTIBIAL BAND SYNDROME OF LEFT SIDE: Status: ACTIVE | Noted: 2021-03-02

## 2021-04-16 PROCEDURE — 99213 OFFICE O/P EST LOW 20 MIN: CPT | Performed by: NURSE PRACTITIONER

## 2021-04-16 RX ORDER — ESCITALOPRAM OXALATE 5 MG/1
5 TABLET ORAL DAILY
Qty: 90 TABLET | Refills: 1 | Status: SHIPPED | OUTPATIENT
Start: 2021-04-16 | End: 2021-05-14

## 2021-04-16 ASSESSMENT — ANXIETY QUESTIONNAIRES
1. FEELING NERVOUS, ANXIOUS, OR ON EDGE: NEARLY EVERY DAY
2. NOT BEING ABLE TO STOP OR CONTROL WORRYING: MORE THAN HALF THE DAYS
7. FEELING AFRAID AS IF SOMETHING AWFUL MIGHT HAPPEN: NEARLY EVERY DAY
5. BEING SO RESTLESS THAT IT IS HARD TO SIT STILL: SEVERAL DAYS
GAD7 TOTAL SCORE: 13
3. WORRYING TOO MUCH ABOUT DIFFERENT THINGS: MORE THAN HALF THE DAYS
6. BECOMING EASILY ANNOYED OR IRRITABLE: SEVERAL DAYS
4. TROUBLE RELAXING: SEVERAL DAYS

## 2021-04-16 ASSESSMENT — PATIENT HEALTH QUESTIONNAIRE - PHQ9
5. POOR APPETITE OR OVEREATING: 2 - MORE THAN HALF THE DAYS
SUM OF ALL RESPONSES TO PHQ QUESTIONS 1-9: 8
CLINICAL INTERPRETATION OF PHQ2 SCORE: 2

## 2021-04-16 ASSESSMENT — FIBROSIS 4 INDEX: FIB4 SCORE: 0.4

## 2021-04-16 ASSESSMENT — PAIN SCALES - GENERAL: PAINLEVEL: NO PAIN

## 2021-04-20 PROBLEM — F41.9 ANXIETY: Status: ACTIVE | Noted: 2021-04-20

## 2021-04-20 NOTE — PROGRESS NOTES
Subjective:   CC: Anxiety and Depression (struggling since the fall )    HPI:   Lissette presents today to discuss:    Anxiety  Reports that she has a history of anxiety and depression. States that anxiety has worsened since the fall after experiencing a miscarriage. States that she took Lexapro in the past after losing her first  and tolerated well. States that she is having difficulty falling asleep. States that she is interested in possibly starting medication again due to ongoing mood. Denies any thoughts of suicide and/or hurting others.    JAYSHREE-7 Questionnaire  Feeling nervous, anxious, or on edge: Nearly every day  Not being able to sop or control worrying: More than half the days  Worrying too much about different things: More than half the days  Trouble relaxing: Several days  Being so restless that it's hard to sit still: Several days  Becoming easily annoyed or irritable: Several days  Feeling afraid as if something awful might happen: Nearly every day  Total: 13    Interpretation of JAYSHREE 7 Total Score   Score Severity :  0-4 No Anxiety   5-9 Mild Anxiety  10-14 Moderate Anxiety  15-21 Severe Anxiety    Depression Screening  Little interest or pleasure in doing things?  1 - several days   Feeling down, depressed , or hopeless? 1 - several days   Trouble falling or staying asleep, or sleeping too much?  1 - several days   Feeling tired or having little energy?  2 - more than half the days Poor appetite or overeating?  2 - more than half the days   Feeling bad about yourself - or that you are a failure or have let yourself or your family down? 1 - several days   Trouble concentrating on things, such as reading the newspaper or watching television? 0 - not at all   Moving or speaking so slowly that other people could have noticed.  Or the opposite - being so fidgety or restless that you have been moving around a lot more than usual?  0 - not at all   Thoughts that you would be better off dead, or of  hurting yourself?  0 - not at all   Patient Health Questionnaire Score: 8     If depressive symptoms identified deferred to follow up visit unless specifically addressed in assesment and plan.    Interpretation of PHQ-9 Total Score   Score Severity   1-4 No Depression   5-9 Mild Depression   10-14 Moderate Depression   15-19 Moderately Severe Depression   20-27 Severe Depression        Past Medical History:   Diagnosis Date   • Depression      Social History     Tobacco Use   • Smoking status: Never Smoker   • Smokeless tobacco: Never Used   Vaping Use   • Vaping Use: Never used   Substance Use Topics   • Alcohol use: Yes     Comment: 5-6 drinks weekly    • Drug use: Yes     Frequency: 2.0 times per week     Types: Marijuana, Inhaled, Oral     Comment: Marijuana     Current Outpatient Medications Ordered in Epic   Medication Sig Dispense Refill   • Multiple Vitamin (MULTIVITAMIN ADULT PO) Take  by mouth.     • Naproxen Sodium (ALEVE PO) Take  by mouth.     • escitalopram (LEXAPRO) 5 MG tablet Take 1 tablet by mouth every day. 90 tablet 1     No current Epic-ordered facility-administered medications on file.     Allergies:  Patient has no known allergies.    Health Maintenance: Completed.    ROS:  Constitutional: Denies fever, chills, night sweats, weight loss/gain or malaise/fatigue.   HENT: Denies nasal congestion, sore throat, hearing loss, enlarged thyroid, or difficulty swallowing.    Eyes: Denies changes in vision, pain.   Respiratory: Denies cough, SOB at rest or activity.    Cardiovascular: Denies tachycardia, chest pain, palpitations, or  leg swelling.   Gastrointestinal: Denies N/V/C/D,abdomen pain, loss appetite, reflux, or hematochezia.   Genitourinary: Denies difficulty voiding, dysuria, nocturia, or hematuria.   Skin: Negative for rash or worrisome moles.   Neurological: Negative for dizziness, focal weakness and headaches.   Endo/Heme/Allergies: Denies bruise/bleed easily, allergies.  "  Psychiatric/Behavioral: Denies nervous/anxious or difficulty sleeping. Hx of depression and anxiety, see HPI.    Objective:   Exam:  /64   Pulse 80   Temp 36.7 °C (98.1 °F)   Resp 12   Ht 1.6 m (5' 3\")   Wt 63.5 kg (140 lb)   LMP 04/14/2021 (Exact Date)   SpO2 98%   Breastfeeding Unknown   BMI 24.80 kg/m²  Body mass index is 24.8 kg/m².  General: Normal appearing. No distress.  HEENT: Normocephalic. Eyes conjunctiva clear lids without ptosis, PERRLA, ears normal shape and contour. Oral, ears, and nasal examine deferred due to current COVID-19 outbreak, no acute concerns. Patient wore a mask during visit.  Neck: Supple without JVD or abnormal masses. Small soft mobile thyroid palpated, no nodules palpated, non-tender.  Pulmonary: Clear to ausculation.  Normal effort. No rales, ronchi, or wheezing.  Cardiovascular: Regular rate and rhythm without murmur. Pedal and radial pulses are intact and equal bilaterally.  Abdomen: Soft, nontender, nondistended. Normal bowel sounds.  Neurologic: Grossly non-focal.  Lymph: No cervical or supraclavicular lymph nodes are palpable  Skin: Warm and dry. No obvious lesions.  Musculoskeletal: Normal gait. No extremity cyanosis, clubbing, or edema. DTR+2  Psych: Normal mood and affect. Alert and oriented x3. Judgment and insight is normal.    Assessment & Plan:   1. Moderate episode of recurrent major depressive disorder (HCC)  2. Anxiety  This is a chronic stable condition. Discussed and encouraged to practice breathing exercises/mindulness activities when she identifies her anxiety increasing, verbalized understanding. Discussed the benefit of improving diet and exercise regimen,  verbalized understanding.  Discussed the benefit of treating depression and risk of not treating ongoing depression with patient.  Discussed starting medication therapy today, verbalized understanding and desire. Instructed to take Lexapro 10 mg daily  may increase to 20 mg daily if " tolerating and symptoms are mildly improved after 14 days, verbalized understanding. Discussed possible side effects of medication and/or anxiety and depression might get worse before getting better, verbalized understanding. Discussed the importance of adjunctive counseling with treatment. Discussed exploring online resources for counseling, verbalized understanding and willingness to explore options. Discussed side effects of medication, verbalized understanding. Encouraged to notify a trusted family member or friend of treatment for depression due to possible worsening of depression with treatment, identified her  as trusted support person. Instructed to RTC if symptoms worsen or medication doesn't start elevate mood within 14 days. Instructed to seek emergency services is she has suicidal/homicide thoughts before returning to clinic.    - escitalopram (LEXAPRO) 5 MG tablet; Take 1 tablet by mouth every day.  Dispense: 90 tablet; Refill: 1    Return in about 4 weeks (around 5/14/2021).    Please note that this dictation was created using voice recognition software. I have made every reasonable attempt to correct obvious errors, but I expect that there are errors of grammar and possibly content that I did not discover before finalizing the note.

## 2021-04-20 NOTE — ASSESSMENT & PLAN NOTE
Reports that she has a history of anxiety and depression. States that anxiety has worsened since the fall after experiencing a miscarriage. States that she took Lexapro in the past after losing her first  and tolerated well. States that she is having difficulty falling asleep. States that she is interested in possibly starting medication again due to ongoing mood. Denies any thoughts of suicide and/or hurting others.    JAYSHREE-7 Questionnaire  Feeling nervous, anxious, or on edge: Nearly every day  Not being able to sop or control worrying: More than half the days  Worrying too much about different things: More than half the days  Trouble relaxing: Several days  Being so restless that it's hard to sit still: Several days  Becoming easily annoyed or irritable: Several days  Feeling afraid as if something awful might happen: Nearly every day  Total: 13    Interpretation of JAYSHREE 7 Total Score   Score Severity :  0-4 No Anxiety   5-9 Mild Anxiety  10-14 Moderate Anxiety  15-21 Severe Anxiety    Depression Screening  Little interest or pleasure in doing things?  1 - several days   Feeling down, depressed , or hopeless? 1 - several days   Trouble falling or staying asleep, or sleeping too much?  1 - several days   Feeling tired or having little energy?  2 - more than half the days Poor appetite or overeating?  2 - more than half the days   Feeling bad about yourself - or that you are a failure or have let yourself or your family down? 1 - several days   Trouble concentrating on things, such as reading the newspaper or watching television? 0 - not at all   Moving or speaking so slowly that other people could have noticed.  Or the opposite - being so fidgety or restless that you have been moving around a lot more than usual?  0 - not at all   Thoughts that you would be better off dead, or of hurting yourself?  0 - not at all   Patient Health Questionnaire Score: 8     If depressive symptoms identified deferred to follow  up visit unless specifically addressed in assesment and plan.    Interpretation of PHQ-9 Total Score   Score Severity   1-4 No Depression   5-9 Mild Depression   10-14 Moderate Depression   15-19 Moderately Severe Depression   20-27 Severe Depression

## 2021-05-14 ENCOUNTER — OFFICE VISIT (OUTPATIENT)
Dept: MEDICAL GROUP | Facility: IMAGING CENTER | Age: 36
End: 2021-05-14
Payer: COMMERCIAL

## 2021-05-14 VITALS
HEART RATE: 75 BPM | SYSTOLIC BLOOD PRESSURE: 110 MMHG | DIASTOLIC BLOOD PRESSURE: 68 MMHG | BODY MASS INDEX: 24.27 KG/M2 | TEMPERATURE: 98.2 F | RESPIRATION RATE: 12 BRPM | HEIGHT: 63 IN | OXYGEN SATURATION: 98 % | WEIGHT: 137 LBS

## 2021-05-14 DIAGNOSIS — F33.41 RECURRENT MAJOR DEPRESSIVE DISORDER, IN PARTIAL REMISSION (HCC): ICD-10-CM

## 2021-05-14 DIAGNOSIS — F41.9 ANXIETY: ICD-10-CM

## 2021-05-14 PROCEDURE — 99213 OFFICE O/P EST LOW 20 MIN: CPT | Performed by: NURSE PRACTITIONER

## 2021-05-14 RX ORDER — ESCITALOPRAM OXALATE 5 MG/1
5 TABLET ORAL DAILY
Qty: 90 TABLET | Refills: 1 | Status: SHIPPED | OUTPATIENT
Start: 2021-05-14 | End: 2022-03-21

## 2021-05-14 ASSESSMENT — PAIN SCALES - GENERAL: PAINLEVEL: NO PAIN

## 2021-05-14 ASSESSMENT — ANXIETY QUESTIONNAIRES
3. WORRYING TOO MUCH ABOUT DIFFERENT THINGS: NOT AT ALL
2. NOT BEING ABLE TO STOP OR CONTROL WORRYING: SEVERAL DAYS
4. TROUBLE RELAXING: SEVERAL DAYS
7. FEELING AFRAID AS IF SOMETHING AWFUL MIGHT HAPPEN: MORE THAN HALF THE DAYS
6. BECOMING EASILY ANNOYED OR IRRITABLE: NOT AT ALL
1. FEELING NERVOUS, ANXIOUS, OR ON EDGE: SEVERAL DAYS
5. BEING SO RESTLESS THAT IT IS HARD TO SIT STILL: NOT AT ALL
GAD7 TOTAL SCORE: 5

## 2021-05-14 ASSESSMENT — PATIENT HEALTH QUESTIONNAIRE - PHQ9
SUM OF ALL RESPONSES TO PHQ QUESTIONS 1-9: 5
5. POOR APPETITE OR OVEREATING: 1 - SEVERAL DAYS
CLINICAL INTERPRETATION OF PHQ2 SCORE: 2

## 2021-05-14 ASSESSMENT — FIBROSIS 4 INDEX: FIB4 SCORE: 0.4

## 2021-05-14 NOTE — PROGRESS NOTES
Subjective:   CC: Follow-Up    HPI:   Lissette presents today to discuss:      Anxiety  Depression  Reports that she is tolerating Lexapro 5 mg daily.  States she experienced nausea at the beginning of therapy.  States that she does experience high energy when she is on conference calls and presenting.  States that she finds herself to be a high-energy person and denies anyone telling her that her behavior has changed.  States that she has started counseling since last visit with previously established [place] counselor feels in sessions.  States that her plan of care is to attend counseling once a month.  Overall, states that she is feeling much better since starting medication.    Depression Screening  Little interest or pleasure in doing things?  1 - several days   Feeling down, depressed , or hopeless? 1 - several days   Trouble falling or staying asleep, or sleeping too much?  0 - not at all   Feeling tired or having little energy?  1 - several days   Poor appetite or overeating?  1 - several days   Feeling bad about yourself - or that you are a failure or have let yourself or your family down? 0 - not at all   Trouble concentrating on things, such as reading the newspaper or watching television? 0 - not at all   Moving or speaking so slowly that other people could have noticed.  Or the opposite - being so fidgety or restless that you have been moving around a lot more than usual?  1 - several days   Thoughts that you would be better off dead, or of hurting yourself?  0 - not at all   Patient Health Questionnaire Score: 5     If depressive symptoms identified deferred to follow up visit unless specifically addressed in assesment and plan.    Interpretation of PHQ-9 Total Score   Score Severity   1-4 No Depression   5-9 Mild Depression   10-14 Moderate Depression   15-19 Moderately Severe Depression   20-27 Severe Depression    JAYSHREE-7 Questionnaire  Feeling nervous, anxious, or on edge: Several days  Not being able  to sop or control worrying: Several days  Worrying too much about different things: Not at all  Trouble relaxing: Several days  Being so restless that it's hard to sit still: Not at all  Becoming easily annoyed or irritable: Not at all  Feeling afraid as if something awful might happen: More than half the days  Total: 5    Interpretation of JAYSHREE 7 Total Score   Score Severity :  0-4 No Anxiety   5-9 Mild Anxiety  10-14 Moderate Anxiety  15-21 Severe Anxiety    4/16/2021 previous PHQ: 8, jayshree: 13    Past Medical History:   Diagnosis Date   • Depression      Social History     Tobacco Use   • Smoking status: Never Smoker   • Smokeless tobacco: Never Used   Vaping Use   • Vaping Use: Never used   Substance Use Topics   • Alcohol use: Yes     Comment: 5-6 drinks weekly    • Drug use: Yes     Frequency: 2.0 times per week     Types: Marijuana, Inhaled, Oral     Comment: Marijuana     Current Outpatient Medications Ordered in Epic   Medication Sig Dispense Refill   • escitalopram (LEXAPRO) 5 MG tablet Take 1 tablet by mouth every day. 90 tablet 1   • Multiple Vitamin (MULTIVITAMIN ADULT PO) Take  by mouth.     • Naproxen Sodium (ALEVE PO) Take  by mouth.       No current Epic-ordered facility-administered medications on file.     Allergies:  Patient has no known allergies.    ROS:  Constitutional: Denies fever, chills, night sweats, weight loss/gain or malaise/fatigue.   HENT: Denies nasal congestion, sore throat, hearing loss, enlarged thyroid, or difficulty swallowing.    Eyes: Denies changes in vision, pain.   Respiratory: Denies cough, SOB at rest or activity.    Cardiovascular: Denies tachycardia, chest pain, palpitations, or  leg swelling.   Gastrointestinal: Denies N/V/C/D,abdomen pain, loss appetite, reflux, or hematochezia.   Genitourinary: Denies difficulty voiding, dysuria, nocturia, or hematuria.   Skin: Negative for rash or worrisome moles.   Neurological: Negative for dizziness, focal weakness and headaches.  "  Endo/Heme/Allergies: Denies bruise/bleed easily, allergies.   Psychiatric/Behavioral: Denies nervous/anxious or difficulty sleeping. Hx of depression and anxiety, see HPI.    Objective:   Exam:  /68 (BP Location: Left arm, Patient Position: Sitting, BP Cuff Size: Adult)   Pulse 75   Temp 36.8 °C (98.2 °F) (Temporal)   Resp 12   Ht 1.6 m (5' 3\")   Wt 62.1 kg (137 lb)   LMP 05/11/2021 (Exact Date)   SpO2 98%   Breastfeeding No   BMI 24.27 kg/m²  Body mass index is 24.27 kg/m².  General: Normal appearing. No distress.  HEENT: Normocephalic. Eyes conjunctiva clear lids without ptosis, PERRLA, ears normal shape and contour. Oral, ears, and nasal examine deferred due to current COVID-19 outbreak, no acute concerns. Patient wore a mask during visit.  Neck: Trachea midline, no masses, no thyromegaly.  Pulmonary: Clear to ausculation.  Normal effort. No rales, ronchi, or wheezing.  Cardiovascular: Regular rate and rhythm without murmur. Pedal and radial pulses are intact and equal bilaterally.  Abdomen: Soft, nontender, nondistended. Normal bowel sounds.  Neurologic: Grossly non-focal.  Skin: Warm and dry. No obvious lesions.  Musculoskeletal: Normal gait. No extremity cyanosis, clubbing, or edema. D  Psych: Normal mood and affect. Alert and oriented x3. Judgment and insight is normal.    Assessment & Plan:   1. Recurrent major depressive disorder, in partial remission (HCC)  2. Anxiety  This is a stable improved condition. Reviewed JAYSHREE and PHQ9 results with patient, verbalized understanding. Instructed to continue taking Lexapro 5 mg daily as tolerated.  Instructed to continue counseling once a month with established counselor.  Discussed the overall benefit of a well-balanced diet, regular exercise, and stress management, verbalized understanding.  Instructed to seek emergency services if she has any suicidal/homicidal thoughts.    -     escitalopram (LEXAPRO) 5 MG tablet; Take 1 tablet by mouth every " day.    Return in about 6 months (around 11/14/2021).    Please note that this dictation was created using voice recognition software. I have made every reasonable attempt to correct obvious errors, but I expect that there are errors of grammar and possibly content that I did not discover before finalizing the note.

## 2021-05-19 NOTE — ASSESSMENT & PLAN NOTE
Reports that she is tolerating Lexapro 5 mg daily.  States she experienced nausea at the beginning of therapy.  States that she does experience high energy when she is on conference calls and presenting.  States that she finds herself to be a high-energy person and denies anyone telling her that her behavior has changed.  States that she has started counseling since last visit with previously established [place] counselor feels in sessions.  States that her plan of care is to attend counseling once a month.  Overall, states that she is feeling much better since starting medication.    Depression Screening  Little interest or pleasure in doing things?  1 - several days   Feeling down, depressed , or hopeless? 1 - several days   Trouble falling or staying asleep, or sleeping too much?  0 - not at all   Feeling tired or having little energy?  1 - several days   Poor appetite or overeating?  1 - several days   Feeling bad about yourself - or that you are a failure or have let yourself or your family down? 0 - not at all   Trouble concentrating on things, such as reading the newspaper or watching television? 0 - not at all   Moving or speaking so slowly that other people could have noticed.  Or the opposite - being so fidgety or restless that you have been moving around a lot more than usual?  1 - several days   Thoughts that you would be better off dead, or of hurting yourself?  0 - not at all   Patient Health Questionnaire Score: 5     If depressive symptoms identified deferred to follow up visit unless specifically addressed in assesment and plan.    Interpretation of PHQ-9 Total Score   Score Severity   1-4 No Depression   5-9 Mild Depression   10-14 Moderate Depression   15-19 Moderately Severe Depression   20-27 Severe Depression    JAYSHREE-7 Questionnaire  Feeling nervous, anxious, or on edge: Several days  Not being able to sop or control worrying: Several days  Worrying too much about different things: Not at  all  Trouble relaxing: Several days  Being so restless that it's hard to sit still: Not at all  Becoming easily annoyed or irritable: Not at all  Feeling afraid as if something awful might happen: More than half the days  Total: 5    Interpretation of JAYSHREE 7 Total Score   Score Severity :  0-4 No Anxiety   5-9 Mild Anxiety  10-14 Moderate Anxiety  15-21 Severe Anxiety    4/16/2021 previous PHQ: 8, jayshree: 13

## 2021-09-09 ENCOUNTER — HOSPITAL ENCOUNTER (OUTPATIENT)
Facility: MEDICAL CENTER | Age: 36
End: 2021-09-09
Attending: NURSE PRACTITIONER
Payer: COMMERCIAL

## 2021-09-09 DIAGNOSIS — Z20.822 EXPOSURE TO COVID-19 VIRUS: ICD-10-CM

## 2021-09-09 PROCEDURE — U0003 INFECTIOUS AGENT DETECTION BY NUCLEIC ACID (DNA OR RNA); SEVERE ACUTE RESPIRATORY SYNDROME CORONAVIRUS 2 (SARS-COV-2) (CORONAVIRUS DISEASE [COVID-19]), AMPLIFIED PROBE TECHNIQUE, MAKING USE OF HIGH THROUGHPUT TECHNOLOGIES AS DESCRIBED BY CMS-2020-01-R: HCPCS

## 2021-09-10 ENCOUNTER — NON-PROVIDER VISIT (OUTPATIENT)
Dept: MEDICAL GROUP | Facility: IMAGING CENTER | Age: 36
End: 2021-09-10
Payer: COMMERCIAL

## 2021-09-10 PROCEDURE — 99999 PR NO CHARGE: CPT | Performed by: NURSE PRACTITIONER

## 2021-09-11 DIAGNOSIS — Z20.822 EXPOSURE TO COVID-19 VIRUS: ICD-10-CM

## 2021-09-12 LAB — COVID ORDER STATUS COVID19: NORMAL

## 2021-09-13 LAB
SARS-COV-2 RNA RESP QL NAA+PROBE: NOTDETECTED
SPECIMEN SOURCE: NORMAL

## 2022-04-25 ENCOUNTER — TELEPHONE (OUTPATIENT)
Dept: SCHEDULING | Facility: IMAGING CENTER | Age: 37
End: 2022-04-25

## 2022-05-03 ENCOUNTER — OFFICE VISIT (OUTPATIENT)
Dept: MEDICAL GROUP | Facility: IMAGING CENTER | Age: 37
End: 2022-05-03
Payer: COMMERCIAL

## 2022-05-03 VITALS
SYSTOLIC BLOOD PRESSURE: 108 MMHG | DIASTOLIC BLOOD PRESSURE: 58 MMHG | OXYGEN SATURATION: 96 % | TEMPERATURE: 97.5 F | WEIGHT: 143 LBS | HEIGHT: 63 IN | HEART RATE: 88 BPM | BODY MASS INDEX: 25.34 KG/M2

## 2022-05-03 DIAGNOSIS — F41.9 ANXIETY: ICD-10-CM

## 2022-05-03 DIAGNOSIS — D68.51 FACTOR V LEIDEN CARRIER (HCC): ICD-10-CM

## 2022-05-03 DIAGNOSIS — Z13.21 ENCOUNTER FOR VITAMIN DEFICIENCY SCREENING: ICD-10-CM

## 2022-05-03 DIAGNOSIS — F33.42 RECURRENT MAJOR DEPRESSIVE DISORDER, IN FULL REMISSION (HCC): ICD-10-CM

## 2022-05-03 DIAGNOSIS — Z13.29 SCREENING FOR THYROID DISORDER: ICD-10-CM

## 2022-05-03 DIAGNOSIS — R63.5 WEIGHT GAIN: ICD-10-CM

## 2022-05-03 DIAGNOSIS — Z80.8 FAMILY HISTORY OF THYROID CANCER: ICD-10-CM

## 2022-05-03 DIAGNOSIS — Z13.1 DIABETES MELLITUS SCREENING: ICD-10-CM

## 2022-05-03 DIAGNOSIS — Z13.0 SCREENING FOR DEFICIENCY ANEMIA: ICD-10-CM

## 2022-05-03 PROBLEM — F43.29 STRESS AND ADJUSTMENT REACTION: Status: RESOLVED | Noted: 2020-01-26 | Resolved: 2022-05-03

## 2022-05-03 PROCEDURE — 99214 OFFICE O/P EST MOD 30 MIN: CPT | Performed by: PHYSICIAN ASSISTANT

## 2022-05-03 ASSESSMENT — PAIN SCALES - GENERAL: PAINLEVEL: NO PAIN

## 2022-05-03 NOTE — PATIENT INSTRUCTIONS
It was a pleasure meeting with you today at [facility]!    Your medical history/records and medications were reviewed today.     Please review my practice information below:    If you have any prescription refill requests, please send them via [employer] or discuss with your provider at the start of your office visits. Please allow 3-5 business days for lab and testing review and you will be contacted via [employer] with those results, or if advised to make a follow up appointment regarding those results, then please do so.     Once resulted, your lab/test/imaging results will show up automatically in your MyChart. Please wait for my interpretation and recommendations prior to viewing your results to avoid any unnecessary confusion or misinterpretation. I will address all of the lab values that I interpret as abnormal and message you accordingly on your MyChart. I will always send you a message about your results even if they are normal. If you do not hear back from me within 5-7 business days after completing your tests, then please send me a message on [employer] so I can obtain your results (especially if you went to an outside lab or imaging center - [facility], [facility], etc).     If you have any additional questions or concerns beyond my interpretation of your results, please make an appointment with me to discuss in further detail.    Please only use the [employer] messaging system for questions regarding your most recent appointment or if advised to use otherwise (glucose or blood pressure reporting).     If you have any new problems or concerns, you must make an appointment to discuss. This includes any referral requests, lab requests (unless advised to notify me for pre-appt labs), medication side effects, or request for medication adjustments.     Please arrive 15 minutes prior to your appointment time to complete your check-in and intake with the medical assistant.      Thank you,    Priya Malone) [place]  GRAYSON  Physician Assistant Certified  [facility]

## 2022-05-03 NOTE — ASSESSMENT & PLAN NOTE
Patient admits to family history of factor V Leiden, her mom had DVT and PE.  Patient states she was screened and is a carrier.  No personal history of blood clots.

## 2022-05-03 NOTE — PROGRESS NOTES
Subjective:     CC:   Chief Complaint   Patient presents with   • Establish Care     Previous ohm pt       HPI:   Lissette presents today to discuss:    Depression  Chronic, controlled on Lexapro 5 mg daily.  Tolerating well.  No side effects.    Anxiety  Chronic, controlled on Lexapro 5 mg daily.  No side effects.    Factor V Leiden carrier (HCC)  Patient admits to family history of factor V Leiden, her mom had DVT and PE.  Patient states she was screened and is a carrier.  No personal history of blood clots.    Weight gain  Patient does admit to some weight gain over the past year.  She states that she has not been as active as usual and wants to start back up on yoga.  She does state that she drinks alcohol several days a week.  She has occasional heartburn and diarrhea when she drinks alcohol.    Family history of thyroid cancer  Patient states her mother had papillary thyroid cancer.  Patient is requesting screening.      Past Medical History:   Diagnosis Date   • Depression      Family History   Problem Relation Age of Onset   • Cancer Mother         thyroid - papillary   • Other Mother         factor v leiden   • DVT Mother    • Pulmonary Embolism Mother    • Cancer Paternal Aunt         breast   • Alzheimer's Disease Paternal Aunt 74   • Drug abuse Father         opioid   • Lung Disease Maternal Grandfather    • Stroke Paternal Grandmother      History reviewed. No pertinent surgical history.  Social History     Tobacco Use   • Smoking status: Never Smoker   • Smokeless tobacco: Never Used   Vaping Use   • Vaping Use: Never used   Substance Use Topics   • Alcohol use: Yes     Comment: 5-6 drinks weekly    • Drug use: Yes     Frequency: 2.0 times per week     Types: Marijuana, Inhaled, Oral     Comment: Marijuana     Social History     Social History Narrative    Works at [employer] -     From [place]    , no kids    Activities: golfing, likes traveling, occasional yoga    Diet: no  "restrictions     Current Outpatient Medications Ordered in Epic   Medication Sig Dispense Refill   • escitalopram (LEXAPRO) 5 MG tablet TAKE 1 TABLET BY MOUTH EVERY DAY 30 Tablet 1   • Multiple Vitamin (MULTIVITAMIN ADULT PO) Take  by mouth.     • Naproxen Sodium (ALEVE PO) Take  by mouth.       No current Epic-ordered facility-administered medications on file.     Patient has no known allergies.    Health Maintenance: Completed    ROS: see hpi  Gen: no fevers/chills  Pulm: no sob, no cough  CV: no chest pain, no palpitations, no edema  GI: no nausea/vomiting, no diarrhea  Skin: no rash    Objective:   Exam:  /58 (BP Location: Left arm, Patient Position: Sitting, BP Cuff Size: Adult)   Pulse 88   Temp 36.4 °C (97.5 °F) (Temporal)   Ht 1.6 m (5' 3\")   Wt 64.9 kg (143 lb)   LMP 05/01/2022 (Exact Date)   SpO2 96%   BMI 25.33 kg/m²    Body mass index is 25.33 kg/m².    Gen: Alert and oriented, No apparent distress.  HEENT: Head atraumatic, normocephalic. Pupils equal and round.  Neck: Neck is supple without lymphadenopathy.   Lungs: Normal effort, CTA bilaterally, no wheezes, rhonchi, or rales  CV: Regular rate and rhythm. No murmurs, rubs, or gallops.  ABD: +BS. Non-tender, non-distended. No rebound, rigidity, or guarding.  Ext: No clubbing, cyanosis, edema.    Assessment & Plan:     36 y.o. female with the following -     1. Recurrent major depressive disorder, in full remission (HCC)  Chronic, controlled on Lexapro 5 mg daily.  Continue current regimen.    2. Anxiety  Chronic, controlled on Lexapro 5 mg daily.  Continue current regimen.  - CBC WITH DIFFERENTIAL; Future  - Comp Metabolic Panel; Future  - TSH; Future  - FREE THYROXINE; Future  - TRIIDOTHYRONINE; Future    3. Factor V Leiden carrier (HCC)  Chronic, no personal history of blood clots.    4. Weight gain  - TSH; Future  - FREE THYROXINE; Future  - TRIIDOTHYRONINE; Future    5. Family history of thyroid cancer  Patient's mother of papillary " thyroid cancer.  Although there is no routine screening for papillary thyroid cancer we will order updated thyroid labs and thyroid ultrasound due to weight gain.  Exam benign.  - TSH; Future  - FREE THYROXINE; Future  - US-THYROID; Future  - TRIIDOTHYRONINE; Future    6. Screening for thyroid disorder  - TSH; Future  - FREE THYROXINE; Future    7. Diabetes mellitus screening  - Comp Metabolic Panel; Future    8. Encounter for vitamin deficiency screening  - VITAMIN D,25 HYDROXY; Future  - VITAMIN B12; Future    9. Screening for deficiency anemia  - CBC WITH DIFFERENTIAL; Future  - VITAMIN B12; Future    Return for Will notify patient to follow-up pending tests.     My total time spent caring for the patient on the day of the encounter was 32 minutes.   This does not include time spent on separately billable procedures/tests.      Priya Castillo PA-C ([place])  Physician Assistant Certified  [facility]    Please note that this dictation was created using voice recognition software. I have made every reasonable attempt to correct obvious errors, but I expect that there are errors of grammar and possibly content that I did not discover before finalizing the note.

## 2022-05-03 NOTE — ASSESSMENT & PLAN NOTE
Patient does admit to some weight gain over the past year.  She states that she has not been as active as usual and wants to start back up on yoga.  She does state that she drinks alcohol several days a week.  She has occasional heartburn and diarrhea when she drinks alcohol.

## 2022-05-10 DIAGNOSIS — F33.41 RECURRENT MAJOR DEPRESSIVE DISORDER, IN PARTIAL REMISSION (HCC): ICD-10-CM

## 2022-05-10 DIAGNOSIS — F41.9 ANXIETY: ICD-10-CM

## 2022-05-10 RX ORDER — ESCITALOPRAM OXALATE 5 MG/1
5 TABLET ORAL DAILY
Qty: 90 TABLET | Refills: 1 | Status: SHIPPED | OUTPATIENT
Start: 2022-05-10 | End: 2022-11-10

## 2022-05-10 NOTE — TELEPHONE ENCOUNTER
Received request via: Pharmacy    Was the patient seen in the last year in this department? Yes    Does the patient have an active prescription (recently filled or refills available) for medication(s) requested? No    [id number]  Last OV

## 2022-11-10 DIAGNOSIS — F41.9 ANXIETY: ICD-10-CM

## 2022-11-10 DIAGNOSIS — F33.41 RECURRENT MAJOR DEPRESSIVE DISORDER, IN PARTIAL REMISSION (HCC): ICD-10-CM

## 2022-11-10 RX ORDER — ESCITALOPRAM OXALATE 5 MG/1
5 TABLET ORAL DAILY
Qty: 90 TABLET | Refills: 1 | Status: SHIPPED | OUTPATIENT
Start: 2022-11-10 | End: 2023-05-12

## 2022-11-10 NOTE — TELEPHONE ENCOUNTER
Received request via: Pharmacy    Was the patient seen in the last year in this department? Yes    Does the patient have an active prescription (recently filled or refills available) for medication(s) requested? No    Does the patient have USP Plus and need 100 day supply (blood pressure, diabetes and cholesterol meds only)? Patient does not have SCP

## 2023-01-05 ENCOUNTER — HOSPITAL ENCOUNTER (OUTPATIENT)
Dept: LAB | Facility: MEDICAL CENTER | Age: 38
End: 2023-01-05
Attending: OBSTETRICS & GYNECOLOGY

## 2023-01-05 ENCOUNTER — HOSPITAL ENCOUNTER (OUTPATIENT)
Facility: MEDICAL CENTER | Age: 38
End: 2023-01-05
Attending: OBSTETRICS & GYNECOLOGY
Payer: COMMERCIAL

## 2023-01-05 LAB
25(OH)D3 SERPL-MCNC: 23 NG/ML (ref 30–100)
ALBUMIN SERPL BCP-MCNC: 4.6 G/DL (ref 3.2–4.9)
ALBUMIN/GLOB SERPL: 1.6 G/DL
ALP SERPL-CCNC: 52 U/L (ref 30–99)
ALT SERPL-CCNC: 16 U/L (ref 2–50)
ANION GAP SERPL CALC-SCNC: 8 MMOL/L (ref 7–16)
AST SERPL-CCNC: 14 U/L (ref 12–45)
B-HCG SERPL-ACNC: ABNORMAL MIU/ML (ref 0–5)
BASOPHILS # BLD AUTO: 0.7 % (ref 0–1.8)
BASOPHILS # BLD: 0.04 K/UL (ref 0–0.12)
BILIRUB SERPL-MCNC: 0.2 MG/DL (ref 0.1–1.5)
BUN SERPL-MCNC: 10 MG/DL (ref 8–22)
CALCIUM ALBUM COR SERPL-MCNC: 8.9 MG/DL (ref 8.5–10.5)
CALCIUM SERPL-MCNC: 9.4 MG/DL (ref 8.5–10.5)
CHLORIDE SERPL-SCNC: 105 MMOL/L (ref 96–112)
CHOLEST SERPL-MCNC: 178 MG/DL (ref 100–199)
CO2 SERPL-SCNC: 23 MMOL/L (ref 20–33)
CREAT SERPL-MCNC: 0.71 MG/DL (ref 0.5–1.4)
EOSINOPHIL # BLD AUTO: 0.09 K/UL (ref 0–0.51)
EOSINOPHIL NFR BLD: 1.5 % (ref 0–6.9)
ERYTHROCYTE [DISTWIDTH] IN BLOOD BY AUTOMATED COUNT: 41.7 FL (ref 35.9–50)
EST. AVERAGE GLUCOSE BLD GHB EST-MCNC: 100 MG/DL
GFR SERPLBLD CREATININE-BSD FMLA CKD-EPI: 111 ML/MIN/1.73 M 2
GLOBULIN SER CALC-MCNC: 2.8 G/DL (ref 1.9–3.5)
GLUCOSE SERPL-MCNC: 93 MG/DL (ref 65–99)
HBA1C MFR BLD: 5.1 % (ref 4–5.6)
HCT VFR BLD AUTO: 40.7 % (ref 37–47)
HDLC SERPL-MCNC: 68 MG/DL
HGB BLD-MCNC: 13.8 G/DL (ref 12–16)
IMM GRANULOCYTES # BLD AUTO: 0.01 K/UL (ref 0–0.11)
IMM GRANULOCYTES NFR BLD AUTO: 0.2 % (ref 0–0.9)
LDLC SERPL CALC-MCNC: 98 MG/DL
LYMPHOCYTES # BLD AUTO: 1.4 K/UL (ref 1–4.8)
LYMPHOCYTES NFR BLD: 24 % (ref 22–41)
MCH RBC QN AUTO: 31.7 PG (ref 27–33)
MCHC RBC AUTO-ENTMCNC: 33.9 G/DL (ref 33.6–35)
MCV RBC AUTO: 93.6 FL (ref 81.4–97.8)
MONOCYTES # BLD AUTO: 0.44 K/UL (ref 0–0.85)
MONOCYTES NFR BLD AUTO: 7.5 % (ref 0–13.4)
NEUTROPHILS # BLD AUTO: 3.85 K/UL (ref 2–7.15)
NEUTROPHILS NFR BLD: 66.1 % (ref 44–72)
NRBC # BLD AUTO: 0 K/UL
NRBC BLD-RTO: 0 /100 WBC
PLATELET # BLD AUTO: 289 K/UL (ref 164–446)
PMV BLD AUTO: 10.5 FL (ref 9–12.9)
POTASSIUM SERPL-SCNC: 4.1 MMOL/L (ref 3.6–5.5)
PROT SERPL-MCNC: 7.4 G/DL (ref 6–8.2)
RBC # BLD AUTO: 4.35 M/UL (ref 4.2–5.4)
SODIUM SERPL-SCNC: 136 MMOL/L (ref 135–145)
TRIGL SERPL-MCNC: 59 MG/DL (ref 0–149)
TSH SERPL DL<=0.005 MIU/L-ACNC: 1.28 UIU/ML (ref 0.38–5.33)
WBC # BLD AUTO: 5.8 K/UL (ref 4.8–10.8)

## 2023-01-05 PROCEDURE — 80053 COMPREHEN METABOLIC PANEL: CPT

## 2023-01-05 PROCEDURE — 84702 CHORIONIC GONADOTROPIN TEST: CPT

## 2023-01-05 PROCEDURE — 85025 COMPLETE CBC W/AUTO DIFF WBC: CPT

## 2023-01-05 PROCEDURE — 80061 LIPID PANEL: CPT

## 2023-01-05 PROCEDURE — 84443 ASSAY THYROID STIM HORMONE: CPT

## 2023-01-05 PROCEDURE — 82306 VITAMIN D 25 HYDROXY: CPT

## 2023-01-05 PROCEDURE — 83036 HEMOGLOBIN GLYCOSYLATED A1C: CPT

## 2023-01-07 ENCOUNTER — HOSPITAL ENCOUNTER (OUTPATIENT)
Dept: LAB | Facility: MEDICAL CENTER | Age: 38
End: 2023-01-07
Attending: OBSTETRICS & GYNECOLOGY
Payer: COMMERCIAL

## 2023-01-07 LAB — B-HCG SERPL-ACNC: ABNORMAL MIU/ML (ref 0–5)

## 2023-01-07 PROCEDURE — 36415 COLL VENOUS BLD VENIPUNCTURE: CPT

## 2023-01-07 PROCEDURE — 84702 CHORIONIC GONADOTROPIN TEST: CPT

## 2023-01-26 ENCOUNTER — HOSPITAL ENCOUNTER (OUTPATIENT)
Facility: MEDICAL CENTER | Age: 38
End: 2023-01-26
Attending: OBSTETRICS & GYNECOLOGY
Payer: COMMERCIAL

## 2023-01-26 ENCOUNTER — HOSPITAL ENCOUNTER (OUTPATIENT)
Dept: LAB | Facility: MEDICAL CENTER | Age: 38
End: 2023-01-26
Attending: OBSTETRICS & GYNECOLOGY

## 2023-01-26 LAB
ABO GROUP BLD: NORMAL
BASOPHILS # BLD AUTO: 0.5 % (ref 0–1.8)
BASOPHILS # BLD: 0.03 K/UL (ref 0–0.12)
BLD GP AB SCN SERPL QL: NORMAL
EOSINOPHIL # BLD AUTO: 0.1 K/UL (ref 0–0.51)
EOSINOPHIL NFR BLD: 1.7 % (ref 0–6.9)
ERYTHROCYTE [DISTWIDTH] IN BLOOD BY AUTOMATED COUNT: 42.9 FL (ref 35.9–50)
HBV SURFACE AG SER QL: ABNORMAL
HCT VFR BLD AUTO: 38.3 % (ref 37–47)
HCV AB SER QL: NORMAL
HGB BLD-MCNC: 12.8 G/DL (ref 12–16)
HIV 1+2 AB+HIV1 P24 AG SERPL QL IA: NORMAL
IMM GRANULOCYTES # BLD AUTO: 0.02 K/UL (ref 0–0.11)
IMM GRANULOCYTES NFR BLD AUTO: 0.3 % (ref 0–0.9)
LYMPHOCYTES # BLD AUTO: 1.52 K/UL (ref 1–4.8)
LYMPHOCYTES NFR BLD: 25.5 % (ref 22–41)
MCH RBC QN AUTO: 31.4 PG (ref 27–33)
MCHC RBC AUTO-ENTMCNC: 33.4 G/DL (ref 33.6–35)
MCV RBC AUTO: 94.1 FL (ref 81.4–97.8)
MONOCYTES # BLD AUTO: 0.47 K/UL (ref 0–0.85)
MONOCYTES NFR BLD AUTO: 7.9 % (ref 0–13.4)
NEUTROPHILS # BLD AUTO: 3.82 K/UL (ref 2–7.15)
NEUTROPHILS NFR BLD: 64.1 % (ref 44–72)
NRBC # BLD AUTO: 0 K/UL
NRBC BLD-RTO: 0 /100 WBC
PLATELET # BLD AUTO: 298 K/UL (ref 164–446)
PMV BLD AUTO: 10.5 FL (ref 9–12.9)
RBC # BLD AUTO: 4.07 M/UL (ref 4.2–5.4)
RH BLD: NORMAL
RUBV AB SER QL: 185 IU/ML
T PALLIDUM AB SER QL IA: ABNORMAL
WBC # BLD AUTO: 6 K/UL (ref 4.8–10.8)

## 2023-01-26 PROCEDURE — 87086 URINE CULTURE/COLONY COUNT: CPT

## 2023-01-26 PROCEDURE — 86900 BLOOD TYPING SEROLOGIC ABO: CPT

## 2023-01-26 PROCEDURE — 86787 VARICELLA-ZOSTER ANTIBODY: CPT

## 2023-01-26 PROCEDURE — 86901 BLOOD TYPING SEROLOGIC RH(D): CPT

## 2023-01-26 PROCEDURE — 85025 COMPLETE CBC W/AUTO DIFF WBC: CPT

## 2023-01-26 PROCEDURE — 87077 CULTURE AEROBIC IDENTIFY: CPT

## 2023-01-26 PROCEDURE — 86780 TREPONEMA PALLIDUM: CPT

## 2023-01-26 PROCEDURE — 86762 RUBELLA ANTIBODY: CPT

## 2023-01-26 PROCEDURE — 87340 HEPATITIS B SURFACE AG IA: CPT

## 2023-01-26 PROCEDURE — 87389 HIV-1 AG W/HIV-1&-2 AB AG IA: CPT

## 2023-01-26 PROCEDURE — 86803 HEPATITIS C AB TEST: CPT

## 2023-01-26 PROCEDURE — 86850 RBC ANTIBODY SCREEN: CPT

## 2023-01-28 LAB
BACTERIA UR CULT: ABNORMAL
BACTERIA UR CULT: ABNORMAL
SIGNIFICANT IND 70042: ABNORMAL
SITE SITE: ABNORMAL
SOURCE SOURCE: ABNORMAL

## 2023-01-29 LAB
VZV IGG SER IA-ACNC: 1211 IV
VZV IGM SER IA-ACNC: 0.12 ISR

## 2023-04-12 ENCOUNTER — HOSPITAL ENCOUNTER (OUTPATIENT)
Facility: MEDICAL CENTER | Age: 38
End: 2023-04-12
Attending: OBSTETRICS & GYNECOLOGY | Admitting: OBSTETRICS & GYNECOLOGY
Payer: COMMERCIAL

## 2023-04-12 VITALS
HEART RATE: 87 BPM | RESPIRATION RATE: 16 BRPM | BODY MASS INDEX: 29.63 KG/M2 | OXYGEN SATURATION: 98 % | WEIGHT: 161 LBS | DIASTOLIC BLOOD PRESSURE: 75 MMHG | TEMPERATURE: 97.6 F | HEIGHT: 62 IN | SYSTOLIC BLOOD PRESSURE: 118 MMHG

## 2023-04-12 LAB
APPEARANCE UR: CLEAR
COLOR UR AUTO: YELLOW
GLUCOSE UR QL STRIP.AUTO: NEGATIVE MG/DL
KETONES UR QL STRIP.AUTO: NEGATIVE MG/DL
LEUKOCYTE ESTERASE UR QL STRIP.AUTO: NEGATIVE
NITRITE UR QL STRIP.AUTO: NEGATIVE
PH UR STRIP.AUTO: 5.5 [PH] (ref 5–8)
PROT UR QL STRIP: NEGATIVE MG/DL
RBC UR QL AUTO: NEGATIVE
SP GR UR STRIP.AUTO: 1.02 (ref 1–1.03)

## 2023-04-12 PROCEDURE — 81002 URINALYSIS NONAUTO W/O SCOPE: CPT

## 2023-04-12 ASSESSMENT — FIBROSIS 4 INDEX: FIB4 SCORE: 0.45

## 2023-04-12 NOTE — ED PROVIDER NOTES
OB ED Note    CC: cramping    HPI:  Ms. Lissette Reilly is a 37 y.o.  @ 20w3d by lmp c/w 10wk US with lower abdominal cramping.  Reports diarrhea for about 1 day with some nausea (although denies any episodes of diarrhea so far today), then this AM had some lower abdominal cramping which she hasn't felt before and was concerned so came in.  Is tolerating some PO.    No VB, LOF.  Hasn't yet felt baby move.    PNC w/ Dr. Gudino, uncomplicated.  Is on ASA 81mg for factor V heterozygous, plan for lovenox postpartum.  Has anatomy US scheduled next week.    PNL:  Rh+/-, RI, HIV neg, RPR NR, HBsAg NR, HCV neg, GC/CT neg/neg      ROS:  Const: denies fevers, general concerns  CV/resp: reports no concerns  GI: denies abd pain, see HPI  : see HPI  Neuro: reports no HA/vision changes    OB History    Para Term  AB Living   2       1     SAB IAB Ectopic Molar Multiple Live Births   1                # Outcome Date GA Lbr Rui/2nd Weight Sex Delivery Anes PTL Lv   2 Current            1 SAB      SAB          GYN: denies STIs, abnl paps    Past Medical History:   Diagnosis Date    Depression     Factor V Leiden carrier (HCC)      PSHx: denies    No current facility-administered medications on file prior to encounter.     Current Outpatient Medications on File Prior to Encounter   Medication Sig Dispense Refill    escitalopram (LEXAPRO) 5 MG tablet TAKE 1 TABLET BY MOUTH EVERY DAY 90 Tablet 1    Multiple Vitamin (MULTIVITAMIN ADULT PO) Take  by mouth.     ASA 81mg daily    Family History   Problem Relation Age of Onset    Cancer Mother         thyroid - papillary    Other Mother         factor v leiden    DVT Mother     Pulmonary Embolism Mother     Cancer Paternal Aunt         breast    Alzheimer's Disease Paternal Aunt 74    Drug abuse Father         opioid    Lung Disease Maternal Grandfather     Stroke Paternal Grandmother        SocHx: denies tob/drugs/EtOH      PE:  Vitals:    23 0933 23  "[id number]   BP: 118/75    Pulse: 87    Resp:  16   Temp: 36.4 °C (97.6 °F)    TempSrc: Temporal    SpO2: 98%    Weight: 73 kg (161 lb)    Height: 1.575 m (5' 2\")      gen: AAO, NAD  abd: soft, gravid, NT  Ext: NT, no edema    SVE: cl/th/h  FHT: 150 by doppler  rhiannon: no ctx     Latest Reference Range & Units 23 09:37   POC Color  Yellow   POC Appearance  Clear   POC Specific Gravity 1.005 - 1.030  1.025   POC Urine PH 5.0 - 8.0  5.5   POC Glucose Negative mg/dL Negative   POC Ketones Negative mg/dL Negative   POC Protein Negative mg/dL Negative   POC Nitrites Negative  Negative   POC Leukocyte Esterase Negative  Negative   POC Blood Negative  Negative       A/P: 37 y.o.  @ 20w3d by lmp c/w 10wk US with mild diarrheal illness.   - clinically well, UA without abnormality.  Discussed importance of staying hydrated.  No signs of PTL, pt reassured  - f/u as scheduled in office next week      Yvonne Arreola MD  [facility]        "

## 2023-04-12 NOTE — PROGRESS NOTES
Pt presents to L&D with complaints of diarrhea and nausea. Pt ambulated to [facility] 4 for assessment.     0932 TOCO and EFM applied, VSS. Pt denies LOF or VB, pt is not feel FM yet but audible FM heard by RN while dopplering baby, . Pt's abdomen is soft and non-tender. Pt states she developed some diarrhea and nausea around 6pm last night. Pt has not eaten anything yet today but has been able to keep down a small amount of water. Pt denies taking anything for her diarrhea or nausea. RN will update Dr. Crouch on pt status.     1020 Message sent to Dr. Crouch    1025 Dr. Crouch updated on pt status.     1040 Dr. Crouch at bedside, SVE closed. Orders for discharge received.     1050 RN at bedside,  labor and precautions given. All questions answered. Pt discharged home in stable condition.

## 2023-06-09 ENCOUNTER — HOSPITAL ENCOUNTER (OUTPATIENT)
Dept: LAB | Facility: MEDICAL CENTER | Age: 38
End: 2023-06-09
Attending: OBSTETRICS & GYNECOLOGY
Payer: COMMERCIAL

## 2023-06-09 PROCEDURE — 86780 TREPONEMA PALLIDUM: CPT

## 2023-06-09 PROCEDURE — 82950 GLUCOSE TEST: CPT

## 2023-06-09 PROCEDURE — 85025 COMPLETE CBC W/AUTO DIFF WBC: CPT

## 2023-06-10 LAB
BASOPHILS # BLD AUTO: 0.4 % (ref 0–1.8)
BASOPHILS # BLD: 0.04 K/UL (ref 0–0.12)
EOSINOPHIL # BLD AUTO: 0.09 K/UL (ref 0–0.51)
EOSINOPHIL NFR BLD: 1 % (ref 0–6.9)
ERYTHROCYTE [DISTWIDTH] IN BLOOD BY AUTOMATED COUNT: 46 FL (ref 35.9–50)
GLUCOSE 1H P 50 G GLC PO SERPL-MCNC: 138 MG/DL (ref 70–139)
HCT VFR BLD AUTO: 34.7 % (ref 37–47)
HGB BLD-MCNC: 11.5 G/DL (ref 12–16)
IMM GRANULOCYTES # BLD AUTO: 0.06 K/UL (ref 0–0.11)
IMM GRANULOCYTES NFR BLD AUTO: 0.7 % (ref 0–0.9)
LYMPHOCYTES # BLD AUTO: 1.48 K/UL (ref 1–4.8)
LYMPHOCYTES NFR BLD: 16.1 % (ref 22–41)
MCH RBC QN AUTO: 31.5 PG (ref 27–33)
MCHC RBC AUTO-ENTMCNC: 33.1 G/DL (ref 32.2–35.5)
MCV RBC AUTO: 95.1 FL (ref 81.4–97.8)
MONOCYTES # BLD AUTO: 0.43 K/UL (ref 0–0.85)
MONOCYTES NFR BLD AUTO: 4.7 % (ref 0–13.4)
NEUTROPHILS # BLD AUTO: 7.11 K/UL (ref 1.82–7.42)
NEUTROPHILS NFR BLD: 77.1 % (ref 44–72)
NRBC # BLD AUTO: 0 K/UL
NRBC BLD-RTO: 0 /100 WBC (ref 0–0.2)
PLATELET # BLD AUTO: 337 K/UL (ref 164–446)
PMV BLD AUTO: 9.9 FL (ref 9–12.9)
RBC # BLD AUTO: 3.65 M/UL (ref 4.2–5.4)
T PALLIDUM AB SER QL IA: NORMAL
WBC # BLD AUTO: 9.2 K/UL (ref 4.8–10.8)

## 2023-08-10 ENCOUNTER — HOSPITAL ENCOUNTER (INPATIENT)
Facility: MEDICAL CENTER | Age: 38
LOS: 3 days | End: 2023-08-13
Attending: OBSTETRICS & GYNECOLOGY | Admitting: OBSTETRICS & GYNECOLOGY
Payer: COMMERCIAL

## 2023-08-10 DIAGNOSIS — Z91.89 AT RISK FOR BREASTFEEDING DIFFICULTY: ICD-10-CM

## 2023-08-10 DIAGNOSIS — D68.51 FACTOR V LEIDEN CARRIER (HCC): ICD-10-CM

## 2023-08-10 PROBLEM — O13.3 GESTATIONAL HYPERTENSION WITHOUT SIGNIFICANT PROTEINURIA DURING PREGNANCY IN THIRD TRIMESTER, ANTEPARTUM: Status: ACTIVE | Noted: 2023-08-10

## 2023-08-10 LAB
ALBUMIN SERPL BCP-MCNC: 3.5 G/DL (ref 3.2–4.9)
ALBUMIN/GLOB SERPL: 1.1 G/DL
ALP SERPL-CCNC: 179 U/L (ref 30–99)
ALT SERPL-CCNC: 14 U/L (ref 2–50)
ANION GAP SERPL CALC-SCNC: 13 MMOL/L (ref 7–16)
AST SERPL-CCNC: 17 U/L (ref 12–45)
BASOPHILS # BLD AUTO: 0.4 % (ref 0–1.8)
BASOPHILS # BLD: 0.04 K/UL (ref 0–0.12)
BILIRUB SERPL-MCNC: <0.2 MG/DL (ref 0.1–1.5)
BUN SERPL-MCNC: 7 MG/DL (ref 8–22)
CALCIUM ALBUM COR SERPL-MCNC: 9.6 MG/DL (ref 8.5–10.5)
CALCIUM SERPL-MCNC: 9.2 MG/DL (ref 8.5–10.5)
CHLORIDE SERPL-SCNC: 108 MMOL/L (ref 96–112)
CO2 SERPL-SCNC: 17 MMOL/L (ref 20–33)
CREAT SERPL-MCNC: 0.37 MG/DL (ref 0.5–1.4)
CREAT UR-MCNC: 8.01 MG/DL
EOSINOPHIL # BLD AUTO: 0.06 K/UL (ref 0–0.51)
EOSINOPHIL NFR BLD: 0.7 % (ref 0–6.9)
ERYTHROCYTE [DISTWIDTH] IN BLOOD BY AUTOMATED COUNT: 45.8 FL (ref 35.9–50)
GFR SERPLBLD CREATININE-BSD FMLA CKD-EPI: 132 ML/MIN/1.73 M 2
GLOBULIN SER CALC-MCNC: 3.1 G/DL (ref 1.9–3.5)
GLUCOSE SERPL-MCNC: 73 MG/DL (ref 65–99)
HCT VFR BLD AUTO: 38.1 % (ref 37–47)
HGB BLD-MCNC: 12.7 G/DL (ref 12–16)
IMM GRANULOCYTES # BLD AUTO: 0.05 K/UL (ref 0–0.11)
IMM GRANULOCYTES NFR BLD AUTO: 0.5 % (ref 0–0.9)
LYMPHOCYTES # BLD AUTO: 1.53 K/UL (ref 1–4.8)
LYMPHOCYTES NFR BLD: 16.6 % (ref 22–41)
MCH RBC QN AUTO: 30.5 PG (ref 27–33)
MCHC RBC AUTO-ENTMCNC: 33.3 G/DL (ref 32.2–35.5)
MCV RBC AUTO: 91.4 FL (ref 81.4–97.8)
MONOCYTES # BLD AUTO: 0.53 K/UL (ref 0–0.85)
MONOCYTES NFR BLD AUTO: 5.8 % (ref 0–13.4)
NEUTROPHILS # BLD AUTO: 6.98 K/UL (ref 1.82–7.42)
NEUTROPHILS NFR BLD: 76 % (ref 44–72)
NRBC # BLD AUTO: 0 K/UL
NRBC BLD-RTO: 0 /100 WBC (ref 0–0.2)
PLATELET # BLD AUTO: 319 K/UL (ref 164–446)
PMV BLD AUTO: 10.1 FL (ref 9–12.9)
POTASSIUM SERPL-SCNC: 3.8 MMOL/L (ref 3.6–5.5)
PROT SERPL-MCNC: 6.6 G/DL (ref 6–8.2)
PROT UR-MCNC: <4 MG/DL (ref 0–15)
PROT/CREAT UR: NORMAL MG/G (ref 10–107)
RBC # BLD AUTO: 4.17 M/UL (ref 4.2–5.4)
SODIUM SERPL-SCNC: 138 MMOL/L (ref 135–145)
WBC # BLD AUTO: 9.2 K/UL (ref 4.8–10.8)

## 2023-08-10 PROCEDURE — 770002 HCHG ROOM/CARE - OB PRIVATE (112)

## 2023-08-10 PROCEDURE — 700111 HCHG RX REV CODE 636 W/ 250 OVERRIDE (IP): Mod: JZ | Performed by: OBSTETRICS & GYNECOLOGY

## 2023-08-10 PROCEDURE — 700102 HCHG RX REV CODE 250 W/ 637 OVERRIDE(OP): Performed by: OBSTETRICS & GYNECOLOGY

## 2023-08-10 PROCEDURE — 700105 HCHG RX REV CODE 258: Mod: JZ | Performed by: OBSTETRICS & GYNECOLOGY

## 2023-08-10 PROCEDURE — 85025 COMPLETE CBC W/AUTO DIFF WBC: CPT

## 2023-08-10 PROCEDURE — 36415 COLL VENOUS BLD VENIPUNCTURE: CPT

## 2023-08-10 PROCEDURE — 80053 COMPREHEN METABOLIC PANEL: CPT

## 2023-08-10 PROCEDURE — 82570 ASSAY OF URINE CREATININE: CPT

## 2023-08-10 PROCEDURE — 84156 ASSAY OF PROTEIN URINE: CPT

## 2023-08-10 PROCEDURE — A9270 NON-COVERED ITEM OR SERVICE: HCPCS | Performed by: OBSTETRICS & GYNECOLOGY

## 2023-08-10 PROCEDURE — 302449 STATCHG TRIAGE ONLY (STATISTIC)

## 2023-08-10 RX ORDER — TERBUTALINE SULFATE 1 MG/ML
0.25 INJECTION, SOLUTION SUBCUTANEOUS
Status: DISCONTINUED | OUTPATIENT
Start: 2023-08-10 | End: 2023-08-12 | Stop reason: HOSPADM

## 2023-08-10 RX ORDER — ESCITALOPRAM OXALATE 5 MG/1
5 TABLET ORAL DAILY
Status: DISCONTINUED | OUTPATIENT
Start: 2023-08-11 | End: 2023-08-12 | Stop reason: HOSPADM

## 2023-08-10 RX ORDER — IBUPROFEN 800 MG/1
800 TABLET ORAL
Status: DISCONTINUED | OUTPATIENT
Start: 2023-08-10 | End: 2023-08-12 | Stop reason: HOSPADM

## 2023-08-10 RX ORDER — ACETAMINOPHEN 325 MG/1
975 TABLET ORAL EVERY 4 HOURS PRN
Status: DISCONTINUED | OUTPATIENT
Start: 2023-08-10 | End: 2023-08-13 | Stop reason: HOSPADM

## 2023-08-10 RX ORDER — SODIUM CHLORIDE, SODIUM LACTATE, POTASSIUM CHLORIDE, CALCIUM CHLORIDE 600; 310; 30; 20 MG/100ML; MG/100ML; MG/100ML; MG/100ML
INJECTION, SOLUTION INTRAVENOUS CONTINUOUS
Status: DISCONTINUED | OUTPATIENT
Start: 2023-08-10 | End: 2023-08-13 | Stop reason: HOSPADM

## 2023-08-10 RX ORDER — ACETAMINOPHEN 500 MG
1000 TABLET ORAL
Status: DISCONTINUED | OUTPATIENT
Start: 2023-08-10 | End: 2023-08-12 | Stop reason: HOSPADM

## 2023-08-10 RX ORDER — OXYTOCIN 10 [USP'U]/ML
10 INJECTION, SOLUTION INTRAMUSCULAR; INTRAVENOUS
Status: DISCONTINUED | OUTPATIENT
Start: 2023-08-10 | End: 2023-08-12 | Stop reason: HOSPADM

## 2023-08-10 RX ORDER — LIDOCAINE HYDROCHLORIDE 10 MG/ML
20 INJECTION, SOLUTION INFILTRATION; PERINEURAL
Status: COMPLETED | OUTPATIENT
Start: 2023-08-10 | End: 2023-08-11

## 2023-08-10 RX ADMIN — MISOPROSTOL 25 MCG: 100 TABLET ORAL at 15:40

## 2023-08-10 RX ADMIN — ACETAMINOPHEN 975 MG: 325 TABLET, FILM COATED ORAL at 12:09

## 2023-08-10 RX ADMIN — OXYTOCIN 2 MILLI-UNITS/MIN: 10 INJECTION, SOLUTION INTRAMUSCULAR; INTRAVENOUS at 23:14

## 2023-08-10 RX ADMIN — SODIUM CHLORIDE, POTASSIUM CHLORIDE, SODIUM LACTATE AND CALCIUM CHLORIDE: 600; 310; 30; 20 INJECTION, SOLUTION INTRAVENOUS at 23:11

## 2023-08-10 RX ADMIN — ACETAMINOPHEN 975 MG: 325 TABLET, FILM COATED ORAL at 17:56

## 2023-08-10 ASSESSMENT — PAIN SCALES - GENERAL
PAINLEVEL: 4
PAINLEVEL: 3

## 2023-08-10 ASSESSMENT — FIBROSIS 4 INDEX: FIB4 SCORE: 0.39

## 2023-08-10 ASSESSMENT — LIFESTYLE VARIABLES: EVER_SMOKED: NEVER

## 2023-08-10 NOTE — PROGRESS NOTES
Pt presents to L&D with complaints of HA. Pt ambulated to [facility] 5 for assessment.     1041 TOCO and EFM applied, VSS. Pt reports +FM, denies LOF or VB. Pt reports a HA at the base of her skull that started this morning. Pt has been watching her BP's throughout the pregnancy but has not been started on any BP medication and lab work has been stable. Pt was concerned about the HA and took her BP which was elevated in the 140's/90's range. Pt has noticed an increase in her swelling in her hands but denies any other symptoms including visual changes or epigastric pain. Upon assessment, minimal swelling in pts lower extremities, negative for clonus. Pt up to provide urine sample.     1107 Dr. Gudino updated, orders for labs received.     1235 Dr. Gudino at bedside, POC discussed.

## 2023-08-10 NOTE — H&P
Labor and Delivery History and Physical    CC: Headache, elevated blood pressure    HPI: Lissette Reilly is a 37-year-old  who presents to labor and delivery today at 37 weeks and 4 days with complaints of headache and elevated blood pressure at home.  She reports she woke up this morning with dull pain at the base of her skull.  She denies visual disturbances.  She denies upper abdominal pain.  She is having some swelling of her lower extremities but not significant.  She checked her blood pressure with her cuff at home and it was 150/90, prompting her to seek care.  In OB triage her blood pressures were intermittently nonsevere range elevated.    She received her prenatal care with myself this pregnancy.  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  She had a normal anatomic scan completed by 21 weeks and 4 days.  She had follow-up biometry at 32 weeks for advanced maternal age.  Estimated fetal weight was in the 54th percentile with abdominal circumference in the 92nd percentile.  There was some concern for possible artifactual asymmetry of the atrium.  Follow-up ultrasound performed 2 weeks later demonstrated persistence of this finding.  She was referred to  PC.  They performed an echocardiogram at 36 weeks and 2 days.  Estimated fetal weight was in the 63rd percentile at 6 pounds 10 ounces.  Abdominal circumference was in the 92nd percentile.  Fetal echocardiogram demonstrated a tortuous ductus arteriosus and mild right atrial dilatation.  Otherwise it was completely unremarkable.  Some mild polyhydramnios was noted.  Weekly antepartum testing was recommended at 37 weeks because of her advanced maternal age and delivery was recommended prior to 41 weeks for this exact purpose but no other reason.    ROS: All other systems were reviewed and were found to be negative    PMH: Depression, factor V Leiden carrier with family history of a DVT (Mother)  PSH: None  OB Hx:  "This is her first pregnancy  GYN Hx: She denies history of sexually transmitted infection including herpes simplex virus for herself or her spouse.  She denies abnormal Pap smears.  Last Pap was negative for intraepithelial lesions or malignancies and HPV negative in 2020.  SH: Denies tobacco, alcohol, or illicit drug use  Medications: Lexapro 5 mg, prenatal vitamins, ASA 81 mg  Allergies: NKDA    BP (!) 143/78   Pulse 90   Temp 36.6 °C (97.8 °F)   Resp 15   Ht 1.575 m (5' 2\")   Wt 90.8 kg (200 lb 3.2 oz)   LMP 2022 (Exact Date)   SpO2 97%   BMI 36.62 kg/m²     General: No apparent distress, sitting up comfortably in bed  Abdomen: Gravid, nontender  Extremities: Mild edema, 2+ reflexes in bilateral lower extremities, no clonus    FHT: Baseline of 140s, moderate variability present, accelerations present, decelerations absent  Tocometer with contractions every 4 minutes  SVE closed/thick/high    Pertinent lab results  ABO AB+  GBS negative    HIV negative  Hep B negative  Hep C negative  RPR negative  Rubella immune    Recent Labs     08/10/23  1142   WBC 9.2   RBC 4.17*   HEMOGLOBIN 12.7   HEMATOCRIT 38.1   MCV 91.4   MCH 30.5   RDW 45.8   PLATELETCT 319   MPV 10.1   NEUTSPOLYS 76.00*   LYMPHOCYTES 16.60*   MONOCYTES 5.80   EOSINOPHILS 0.70   BASOPHILS 0.40     Creatinine 0.3  AST 17  ALT 14  Urine protein creatinine ratio: Too low to calculate    Assessment:  Early term intrauterine pregnancy at 37 weeks and 4 days  Gestational hypertension  Maternal factor V Leiden carrier  Fetal torturous ductus arteriosus and mild right atrial dilatation    Plan:  Admit to labor and delivery for induction of labor with misoprostol.  She may have an epidural on demand.  Anticipate vaginal delivery.  Please initiate antihypertensive protocol as needed and contact physician on call.  She will need to start Lovenox 40 mg subcu injections postpartum through 6 weeks given her history.  Plan for  echo prior " to discharge.    Jody Gudino M.D.

## 2023-08-10 NOTE — PROGRESS NOTES
1300-BS report received from Declan RN-pt care assumed.  1334-TOCO and US on.  VSS-JS=349/94.  1540-Cytotec placement per Dr. Gudino.  1544-CK=139/74.  1630-Pt eating-FOB at BS. 1720-BS report given to Ivan RN-pt care assumed.

## 2023-08-11 ENCOUNTER — ANESTHESIA EVENT (OUTPATIENT)
Dept: ANESTHESIOLOGY | Facility: MEDICAL CENTER | Age: 38
End: 2023-08-11
Payer: COMMERCIAL

## 2023-08-11 ENCOUNTER — ANESTHESIA (OUTPATIENT)
Dept: ANESTHESIOLOGY | Facility: MEDICAL CENTER | Age: 38
End: 2023-08-11
Payer: COMMERCIAL

## 2023-08-11 LAB
HOLDING TUBE BB 8507: NORMAL
T PALLIDUM AB SER QL IA: NORMAL

## 2023-08-11 PROCEDURE — 700111 HCHG RX REV CODE 636 W/ 250 OVERRIDE (IP): Mod: JZ | Performed by: ANESTHESIOLOGY

## 2023-08-11 PROCEDURE — 700101 HCHG RX REV CODE 250: Performed by: ANESTHESIOLOGY

## 2023-08-11 PROCEDURE — 59409 OBSTETRICAL CARE: CPT

## 2023-08-11 PROCEDURE — A9270 NON-COVERED ITEM OR SERVICE: HCPCS

## 2023-08-11 PROCEDURE — 700111 HCHG RX REV CODE 636 W/ 250 OVERRIDE (IP): Mod: JZ | Performed by: OBSTETRICS & GYNECOLOGY

## 2023-08-11 PROCEDURE — 700105 HCHG RX REV CODE 258: Mod: JZ | Performed by: OBSTETRICS & GYNECOLOGY

## 2023-08-11 PROCEDURE — A9270 NON-COVERED ITEM OR SERVICE: HCPCS | Performed by: OBSTETRICS & GYNECOLOGY

## 2023-08-11 PROCEDURE — 700101 HCHG RX REV CODE 250: Performed by: OBSTETRICS & GYNECOLOGY

## 2023-08-11 PROCEDURE — 302790 HCHG STAT ANTEPARTUM CARE, DAILY

## 2023-08-11 PROCEDURE — 304965 HCHG RECOVERY SERVICES

## 2023-08-11 PROCEDURE — 700102 HCHG RX REV CODE 250 W/ 637 OVERRIDE(OP): Performed by: OBSTETRICS & GYNECOLOGY

## 2023-08-11 PROCEDURE — 0KQM0ZZ REPAIR PERINEUM MUSCLE, OPEN APPROACH: ICD-10-PCS | Performed by: OBSTETRICS & GYNECOLOGY

## 2023-08-11 PROCEDURE — 700102 HCHG RX REV CODE 250 W/ 637 OVERRIDE(OP)

## 2023-08-11 PROCEDURE — 303615 HCHG EPIDURAL/SPINAL ANESTHESIA FOR LABOR

## 2023-08-11 PROCEDURE — 770002 HCHG ROOM/CARE - OB PRIVATE (112)

## 2023-08-11 PROCEDURE — 86780 TREPONEMA PALLIDUM: CPT

## 2023-08-11 PROCEDURE — 700111 HCHG RX REV CODE 636 W/ 250 OVERRIDE (IP): Performed by: OBSTETRICS & GYNECOLOGY

## 2023-08-11 RX ORDER — DIPHENHYDRAMINE HCL 25 MG
25 TABLET ORAL
Status: COMPLETED | OUTPATIENT
Start: 2023-08-11 | End: 2023-08-11

## 2023-08-11 RX ORDER — LIDOCAINE HYDROCHLORIDE AND EPINEPHRINE 15; 5 MG/ML; UG/ML
INJECTION, SOLUTION EPIDURAL PRN
Status: DISCONTINUED | OUTPATIENT
Start: 2023-08-11 | End: 2023-08-11 | Stop reason: SURG

## 2023-08-11 RX ORDER — SODIUM CHLORIDE, SODIUM LACTATE, POTASSIUM CHLORIDE, AND CALCIUM CHLORIDE .6; .31; .03; .02 G/100ML; G/100ML; G/100ML; G/100ML
250 INJECTION, SOLUTION INTRAVENOUS PRN
Status: DISCONTINUED | OUTPATIENT
Start: 2023-08-11 | End: 2023-08-12 | Stop reason: HOSPADM

## 2023-08-11 RX ORDER — BUPIVACAINE HYDROCHLORIDE 2.5 MG/ML
INJECTION, SOLUTION EPIDURAL; INFILTRATION; INTRACAUDAL
Status: COMPLETED
Start: 2023-08-11 | End: 2023-08-11

## 2023-08-11 RX ORDER — MISOPROSTOL 200 UG/1
TABLET ORAL
Status: COMPLETED
Start: 2023-08-11 | End: 2023-08-11

## 2023-08-11 RX ORDER — BUPIVACAINE HYDROCHLORIDE 2.5 MG/ML
INJECTION, SOLUTION EPIDURAL; INFILTRATION; INTRACAUDAL PRN
Status: DISCONTINUED | OUTPATIENT
Start: 2023-08-11 | End: 2023-08-11 | Stop reason: SURG

## 2023-08-11 RX ORDER — ROPIVACAINE HYDROCHLORIDE 2 MG/ML
INJECTION, SOLUTION EPIDURAL; INFILTRATION; PERINEURAL CONTINUOUS
Status: DISCONTINUED | OUTPATIENT
Start: 2023-08-11 | End: 2023-08-13 | Stop reason: HOSPADM

## 2023-08-11 RX ORDER — EPHEDRINE SULFATE 50 MG/ML
5 INJECTION, SOLUTION INTRAVENOUS
Status: DISCONTINUED | OUTPATIENT
Start: 2023-08-11 | End: 2023-08-12 | Stop reason: HOSPADM

## 2023-08-11 RX ORDER — SODIUM CHLORIDE, SODIUM LACTATE, POTASSIUM CHLORIDE, AND CALCIUM CHLORIDE .6; .31; .03; .02 G/100ML; G/100ML; G/100ML; G/100ML
1000 INJECTION, SOLUTION INTRAVENOUS
Status: DISCONTINUED | OUTPATIENT
Start: 2023-08-11 | End: 2023-08-12 | Stop reason: HOSPADM

## 2023-08-11 RX ADMIN — ESCITSLOPRAM 5 MG: 5 TABLET ORAL at 06:05

## 2023-08-11 RX ADMIN — SODIUM CHLORIDE, POTASSIUM CHLORIDE, SODIUM LACTATE AND CALCIUM CHLORIDE: 600; 310; 30; 20 INJECTION, SOLUTION INTRAVENOUS at 10:08

## 2023-08-11 RX ADMIN — ACETAMINOPHEN 975 MG: 325 TABLET, FILM COATED ORAL at 13:18

## 2023-08-11 RX ADMIN — MISOPROSTOL 200 MCG: 200 TABLET ORAL at 22:56

## 2023-08-11 RX ADMIN — DIPHENHYDRAMINE HYDROCHLORIDE 25 MG: 25 TABLET ORAL at 19:45

## 2023-08-11 RX ADMIN — OXYTOCIN 16 MILLI-UNITS/MIN: 10 INJECTION, SOLUTION INTRAMUSCULAR; INTRAVENOUS at 18:11

## 2023-08-11 RX ADMIN — FENTANYL CITRATE 100 MCG: 50 INJECTION, SOLUTION INTRAMUSCULAR; INTRAVENOUS at 09:29

## 2023-08-11 RX ADMIN — LIDOCAINE HYDROCHLORIDE 20 ML: 10 INJECTION, SOLUTION INFILTRATION; PERINEURAL at 22:55

## 2023-08-11 RX ADMIN — SODIUM CHLORIDE, POTASSIUM CHLORIDE, SODIUM LACTATE AND CALCIUM CHLORIDE: 600; 310; 30; 20 INJECTION, SOLUTION INTRAVENOUS at 18:13

## 2023-08-11 RX ADMIN — ACETAMINOPHEN 975 MG: 325 TABLET, FILM COATED ORAL at 19:25

## 2023-08-11 RX ADMIN — ROPIVACAINE HYDROCHLORIDE: 2 INJECTION, SOLUTION EPIDURAL; INFILTRATION at 18:14

## 2023-08-11 RX ADMIN — OXYTOCIN 20 UNITS: 10 INJECTION, SOLUTION INTRAMUSCULAR; INTRAVENOUS at 22:26

## 2023-08-11 RX ADMIN — ROPIVACAINE HYDROCHLORIDE: 2 INJECTION, SOLUTION EPIDURAL; INFILTRATION at 09:59

## 2023-08-11 RX ADMIN — LIDOCAINE HYDROCHLORIDE,EPINEPHRINE BITARTRATE 3 ML: 15; .005 INJECTION, SOLUTION EPIDURAL; INFILTRATION; INTRACAUDAL; PERINEURAL at 09:24

## 2023-08-11 RX ADMIN — BUPIVACAINE HYDROCHLORIDE 5 ML: 2.5 INJECTION, SOLUTION EPIDURAL; INFILTRATION; INTRACAUDAL at 09:29

## 2023-08-11 RX ADMIN — SODIUM CHLORIDE, POTASSIUM CHLORIDE, SODIUM LACTATE AND CALCIUM CHLORIDE: 600; 310; 30; 20 INJECTION, SOLUTION INTRAVENOUS at 13:40

## 2023-08-11 RX ADMIN — OXYTOCIN 12 MILLI-UNITS/MIN: 10 INJECTION, SOLUTION INTRAMUSCULAR; INTRAVENOUS at 13:44

## 2023-08-11 ASSESSMENT — PATIENT HEALTH QUESTIONNAIRE - PHQ9
1. LITTLE INTEREST OR PLEASURE IN DOING THINGS: NOT AT ALL
2. FEELING DOWN, DEPRESSED, IRRITABLE, OR HOPELESS: NOT AT ALL
SUM OF ALL RESPONSES TO PHQ9 QUESTIONS 1 AND 2: 0

## 2023-08-11 ASSESSMENT — PAIN SCALES - GENERAL: PAIN_LEVEL: 0

## 2023-08-11 NOTE — PROGRESS NOTES
"Labor Note    S: No concerns. Mother at bedside.     O: /62   Pulse 74   Temp 36.7 °C (98.1 °F) (Temporal)   Resp 15   Ht 1.575 m (5' 2\")   Wt 90.8 kg (200 lb 3.2 oz)   SpO2 94%   Gen: NAD  SVE: 4/50/-3 AROM copious clear fluid. IUPC placed     [place]: CTX q2-5  FHT: Baseline 150s with moderate LTV. +accels. No variables/decels    Labs: AB+, RI, GBS neg  PC ratio: too low to calculate    A/P 36yo  @ 37w5d, IOL for GHTN  AMA  Depression on Lexapro 5mg  -  Labor: Remains latent. Continue pitocin. Amniotomy deferred given fetal station   GHTN: no s/sx of PreE  FWB: Cat I  Pain: Epidural in place     April Rausch MD   "

## 2023-08-11 NOTE — PROGRESS NOTES
0355: Received report from Lynne MCKINNON. POC discussed.     0700: Gave report to Cathryn JOSE discussed.

## 2023-08-11 NOTE — ANESTHESIA PROCEDURE NOTES
Epidural Block    Date/Time: 8/11/2023 9:20 AM    Performed by: Nickolas Alston M.D.  Authorized by: Nickolas Alston M.D.    Patient Location:  OB  Start Time:  8/11/2023 9:20 AM  End Time:  8/11/2023 9:24 AM  Reason for Block: labor analgesia    patient identified, IV checked, site marked, risks and benefits discussed, surgical consent, monitors and equipment checked and pre-op evaluation    Patient Position:  Sitting  Prep: ChloraPrep, patient draped and sterile technique    Monitoring:  Blood pressure, continuous pulse oximetry and heart rate  Approach:  Midline  Location:  L3-L4  Injection Technique:  JAROD saline  Skin infiltration:  Lidocaine  Strength:  1%  Dose:  3ml  Needle Type:  Tuohy  Needle Gauge:  17 G  Needle Length:  3.5 in  Loss of resistance::  5  Catheter Size:  19 G  Catheter at Skin Depth:  10  Test Dose Result:  Negative

## 2023-08-11 NOTE — PROGRESS NOTES
OB Progress Note    Feeling her contractions. FHT with baseline of 150's. Moderate variability present. Accelerations present. Decelerations absent. [place] with contractions every 2-3 min. SVE 1/50/-3. Cooks balloon placed 80/80cc.

## 2023-08-11 NOTE — PROGRESS NOTES
"Labor Note    S: Now comfortable    O: /81   Pulse 73   Temp 36.7 °C (98.1 °F) (Temporal)   Resp 15   Ht 1.575 m (5' 2\")   Wt 90.8 kg (200 lb 3.2 oz)   SpO2 94%   Gen: NAD  SVE: 4/50/-4    [place] on US     [place]: CTX q2-5  FHT: Baseline 150s with moderate LTV. +accels. No variables/decels    Labs: AB+, RI, GBS neg  PC ratio: too low to calculate    A/P 38yo  @ 37w5d, IOL for GHTN  AMA  Depression on Lexapro 5mg  -  Labor: Remains latent. Continue pitocin. Amniotomy deferred given fetal station   GHTN: no s/sx of PreE  FWB: Cat I  Pain: Epidural in place     April Rausch MD   "

## 2023-08-11 NOTE — PROGRESS NOTES
OB Progress Note    Tracing reviewed. FHT with baseline of 140's. Moderate variability present. Accelerations present. Decelerations absent. [place] with contractions spacing out to every 5 min. Balloon in place. Start pitocin.

## 2023-08-11 NOTE — ANESTHESIA PREPROCEDURE EVALUATION
Date: 08/11/23  Procedure: Labor Epidural         Relevant Problems   CARDIAC   (positive) Gestational hypertension without significant proteinuria during pregnancy in third trimester, antepartum      OB   (positive) Gestational hypertension without significant proteinuria during pregnancy in third trimester, antepartum       Physical Exam    Airway   Mallampati: II  TM distance: >3 FB  Neck ROM: full       Cardiovascular - normal exam  Rhythm: regular  Rate: normal  (-) murmur     Dental - normal exam           Pulmonary - normal exam  Breath sounds clear to auscultation     Abdominal    Neurological - normal exam                 Anesthesia Plan    ASA 2       Plan - epidural   Neuraxial block will be labor analgesia                  Pertinent diagnostic labs and testing reviewed    Informed Consent:    Anesthetic plan and risks discussed with patient.

## 2023-08-11 NOTE — PROGRESS NOTES
0700 Report received from Neel MCKINNON, POC discussed. Pt bolusing for an epidural. Pt updated on time frame epidural. All questions answered.     0735 Dr. Rausch and RN at bedside, balloon removed, pt not tolerating sve well, will wait for epidural and recheck SVE.     0915 Anesthesia at bedside for epidural placement.     1100 Rate change pitocin to 10 milliunits/hr     1135 Dr. Working at bedside, SVE 4/50/-4, unable to rupture at this time.     1255 RN at bedside, Pt repositioned to left side with the peanut ball. Pt denies any further needs at this time.     1345 RN and Dr. Working at bedside, SVE as charted. AROM clear fluid and IUPC placed. Pt placed on her left side with a peanut ball    1515 RN at bedside, pt comfortable with her epidural, pt repositioned to her right side with a peanut ball    1620 RN at bedside, Pt feeling some pressure consistently but more so with her contractions. SVE as charted. Pt requesting a break from the peanut ball, Pt placed back in semi-fowlers.     1718 RN called to bedside, pt reports feeling some pain in her right hip. Pt states that this is where it was located last night as well. Pt repositioned to right side and bolus button pressed. Pt encouraged to press her bolus button again when able to if still experiencing some discomfort. RN will reassess at 1745.     1745 RN at bedside, pt reports relief from pressing the bolus button. Pt denies any further needs at this time.     1825 RN at bedside, pt transferred from right to left side lye and release. Pt then repositioned supine.     1900 Report given to Asye MCKINNON, POC discussed.

## 2023-08-11 NOTE — PROGRESS NOTES
"Labor Note    S: Feeling uncomfortable and getting bolus for epidural    O: /74   Pulse 74   Temp 36.1 °C (97 °F) (Temporal)   Resp 15   Ht 1.575 m (5' 2\")   Wt 90.8 kg (200 lb 3.2 oz)   SpO2 97%   BP Range: 129-146/74-93  Gen: NAD  SVE: deferred. Cook removed    [place]: CTX q2-5  FHT: Baseline 150s with moderate LTV. +accels. No variables/decels    Labs: AB+, RI, GBS neg  Recent Labs     08/10/23  1142   WBC 9.2   RBC 4.17*   HEMOGLOBIN 12.7   HEMATOCRIT 38.1   MCV 91.4   MCH 30.5   RDW 45.8   PLATELETCT 319   MPV 10.1   NEUTSPOLYS 76.00*   LYMPHOCYTES 16.60*   MONOCYTES 5.80   EOSINOPHILS 0.70   BASOPHILS 0.40   PC ratio: too low to calculate    A/P 36yo  @ 37w5d, IOL for GHTN  AMA  Depression on Lexapro 5mg  -  Labor: Remains latent. Continue pitocin. SVE after epidural  GHTN: no s/sx of PreE  FWB: Cat I  Pain: awaiting epidural    April Rausch MD   "

## 2023-08-11 NOTE — PROGRESS NOTES
1410 - Report received from PRATIBHA Love. POC discussed.    0525 - Report given to PRATIBHA Shaikh. POC discussed.

## 2023-08-11 NOTE — CARE PLAN
The patient is Stable - Low risk of patient condition declining or worsening    Shift Goals  Clinical Goals: cervical change, category 1 fetal heart tracing  Patient Goals: healthy mom, heathy baby  Family Goals: support    Progress made toward(s) clinical / shift goals:    Problem: Knowledge Deficit - L&D  Goal: Patient and family/caregivers will demonstrate understanding of plan of care, disease process/condition, diagnostic tests and medications  Outcome: Progressing     Problem: Risk for Infection and Impaired Wound Healing  Goal: Patient will remain free from infection  Outcome: Progressing

## 2023-08-11 NOTE — PROGRESS NOTES
1720 -  report received from Joanna. Pt resting comfortably with FOB at bedside, no questions at this time, care assumed.

## 2023-08-12 LAB
ERYTHROCYTE [DISTWIDTH] IN BLOOD BY AUTOMATED COUNT: 45.2 FL (ref 35.9–50)
HCT VFR BLD AUTO: 31.7 % (ref 37–47)
HGB BLD-MCNC: 11 G/DL (ref 12–16)
MCH RBC QN AUTO: 31.1 PG (ref 27–33)
MCHC RBC AUTO-ENTMCNC: 34.7 G/DL (ref 32.2–35.5)
MCV RBC AUTO: 89.5 FL (ref 81.4–97.8)
PLATELET # BLD AUTO: 251 K/UL (ref 164–446)
PMV BLD AUTO: 9.8 FL (ref 9–12.9)
RBC # BLD AUTO: 3.54 M/UL (ref 4.2–5.4)
WBC # BLD AUTO: 15.8 K/UL (ref 4.8–10.8)

## 2023-08-12 PROCEDURE — 700102 HCHG RX REV CODE 250 W/ 637 OVERRIDE(OP): Performed by: OBSTETRICS & GYNECOLOGY

## 2023-08-12 PROCEDURE — A9270 NON-COVERED ITEM OR SERVICE: HCPCS | Performed by: OBSTETRICS & GYNECOLOGY

## 2023-08-12 PROCEDURE — 700111 HCHG RX REV CODE 636 W/ 250 OVERRIDE (IP): Mod: JZ | Performed by: OBSTETRICS & GYNECOLOGY

## 2023-08-12 PROCEDURE — 85027 COMPLETE CBC AUTOMATED: CPT

## 2023-08-12 PROCEDURE — RXMED WILLOW AMBULATORY MEDICATION CHARGE: Performed by: OBSTETRICS & GYNECOLOGY

## 2023-08-12 PROCEDURE — 770002 HCHG ROOM/CARE - OB PRIVATE (112)

## 2023-08-12 PROCEDURE — 700111 HCHG RX REV CODE 636 W/ 250 OVERRIDE (IP): Performed by: OBSTETRICS & GYNECOLOGY

## 2023-08-12 PROCEDURE — 36415 COLL VENOUS BLD VENIPUNCTURE: CPT

## 2023-08-12 RX ORDER — ESCITALOPRAM OXALATE 5 MG/1
5 TABLET ORAL DAILY
Status: DISCONTINUED | OUTPATIENT
Start: 2023-08-12 | End: 2023-08-13 | Stop reason: HOSPADM

## 2023-08-12 RX ORDER — IBUPROFEN 800 MG/1
800 TABLET ORAL EVERY 8 HOURS PRN
Status: DISCONTINUED | OUTPATIENT
Start: 2023-08-12 | End: 2023-08-13 | Stop reason: HOSPADM

## 2023-08-12 RX ORDER — ENOXAPARIN SODIUM 100 MG/ML
40 INJECTION SUBCUTANEOUS DAILY
Qty: 45 EACH | Refills: 0 | Status: ACTIVE | OUTPATIENT
Start: 2023-08-13

## 2023-08-12 RX ORDER — CALCIUM CARBONATE 500 MG/1
1000 TABLET, CHEWABLE ORAL EVERY 6 HOURS PRN
Status: DISCONTINUED | OUTPATIENT
Start: 2023-08-12 | End: 2023-08-13 | Stop reason: HOSPADM

## 2023-08-12 RX ORDER — BISACODYL 10 MG
10 SUPPOSITORY, RECTAL RECTAL PRN
Status: DISCONTINUED | OUTPATIENT
Start: 2023-08-12 | End: 2023-08-13 | Stop reason: HOSPADM

## 2023-08-12 RX ORDER — IBUPROFEN 800 MG/1
800 TABLET ORAL EVERY 8 HOURS PRN
Qty: 30 TABLET | Refills: 0 | Status: SHIPPED | OUTPATIENT
Start: 2023-08-12

## 2023-08-12 RX ORDER — ENOXAPARIN SODIUM 100 MG/ML
40 INJECTION SUBCUTANEOUS DAILY
Status: DISCONTINUED | OUTPATIENT
Start: 2023-08-12 | End: 2023-08-13

## 2023-08-12 RX ORDER — DOCUSATE SODIUM 100 MG/1
100 CAPSULE, LIQUID FILLED ORAL 2 TIMES DAILY PRN
Status: DISCONTINUED | OUTPATIENT
Start: 2023-08-12 | End: 2023-08-13 | Stop reason: HOSPADM

## 2023-08-12 RX ORDER — VITAMIN A ACETATE, BETA CAROTENE, ASCORBIC ACID, CHOLECALCIFEROL, .ALPHA.-TOCOPHEROL ACETATE, DL-, THIAMINE MONONITRATE, RIBOFLAVIN, NIACINAMIDE, PYRIDOXINE HYDROCHLORIDE, FOLIC ACID, CYANOCOBALAMIN, CALCIUM CARBONATE, FERROUS FUMARATE, ZINC OXIDE, CUPRIC OXIDE 3080; 12; 120; 400; 1; 1.84; 3; 20; 22; 920; 25; 200; 27; 10; 2 [IU]/1; UG/1; MG/1; [IU]/1; MG/1; MG/1; MG/1; MG/1; MG/1; [IU]/1; MG/1; MG/1; MG/1; MG/1; MG/1
1 TABLET, FILM COATED ORAL
Status: DISCONTINUED | OUTPATIENT
Start: 2023-08-12 | End: 2023-08-13 | Stop reason: HOSPADM

## 2023-08-12 RX ORDER — SIMETHICONE 125 MG
125 TABLET,CHEWABLE ORAL 4 TIMES DAILY PRN
Status: DISCONTINUED | OUTPATIENT
Start: 2023-08-12 | End: 2023-08-13 | Stop reason: HOSPADM

## 2023-08-12 RX ORDER — MISOPROSTOL 200 UG/1
800 TABLET ORAL
Status: COMPLETED | OUTPATIENT
Start: 2023-08-12 | End: 2023-08-11

## 2023-08-12 RX ORDER — ACETAMINOPHEN 500 MG
1000 TABLET ORAL EVERY 6 HOURS PRN
Status: DISCONTINUED | OUTPATIENT
Start: 2023-08-12 | End: 2023-08-13 | Stop reason: HOSPADM

## 2023-08-12 RX ORDER — SODIUM CHLORIDE, SODIUM LACTATE, POTASSIUM CHLORIDE, CALCIUM CHLORIDE 600; 310; 30; 20 MG/100ML; MG/100ML; MG/100ML; MG/100ML
INJECTION, SOLUTION INTRAVENOUS PRN
Status: DISCONTINUED | OUTPATIENT
Start: 2023-08-12 | End: 2023-08-13 | Stop reason: HOSPADM

## 2023-08-12 RX ADMIN — ESCITSLOPRAM 5 MG: 5 TABLET ORAL at 08:19

## 2023-08-12 RX ADMIN — OXYTOCIN 125 ML/HR: 10 INJECTION, SOLUTION INTRAMUSCULAR; INTRAVENOUS at 00:12

## 2023-08-12 RX ADMIN — ACETAMINOPHEN 1000 MG: 500 TABLET, FILM COATED ORAL at 14:50

## 2023-08-12 RX ADMIN — OXYTOCIN 125 ML/HR: 10 INJECTION, SOLUTION INTRAMUSCULAR; INTRAVENOUS at 01:40

## 2023-08-12 RX ADMIN — ENOXAPARIN SODIUM 40 MG: 40 INJECTION SUBCUTANEOUS at 06:02

## 2023-08-12 RX ADMIN — ACETAMINOPHEN 975 MG: 325 TABLET, FILM COATED ORAL at 08:19

## 2023-08-12 RX ADMIN — PRENATAL WITH FERROUS FUM AND FOLIC ACID 1 TABLET: 3080; 920; 120; 400; 22; 1.84; 3; 20; 10; 1; 12; 200; 27; 25; 2 TABLET ORAL at 08:19

## 2023-08-12 RX ADMIN — DOCUSATE SODIUM 100 MG: 100 CAPSULE, LIQUID FILLED ORAL at 08:19

## 2023-08-12 ASSESSMENT — EDINBURGH POSTNATAL DEPRESSION SCALE (EPDS)
I HAVE LOOKED FORWARD WITH ENJOYMENT TO THINGS: AS MUCH AS I EVER DID
THE THOUGHT OF HARMING MYSELF HAS OCCURRED TO ME: NEVER
I HAVE FELT SAD OR MISERABLE: NO, NOT AT ALL
I HAVE BEEN SO UNHAPPY THAT I HAVE HAD DIFFICULTY SLEEPING: NOT AT ALL
I HAVE BEEN ABLE TO LAUGH AND SEE THE FUNNY SIDE OF THINGS: AS MUCH AS I ALWAYS COULD
I HAVE BEEN ANXIOUS OR WORRIED FOR NO GOOD REASON: YES, SOMETIMES
I HAVE FELT SCARED OR PANICKY FOR NO GOOD REASON: YES, SOMETIMES
THINGS HAVE BEEN GETTING ON TOP OF ME: NO, I HAVE BEEN COPING AS WELL AS EVER
I HAVE BEEN SO UNHAPPY THAT I HAVE BEEN CRYING: NO, NEVER
I HAVE BLAMED MYSELF UNNECESSARILY WHEN THINGS WENT WRONG: YES, SOME OF THE TIME

## 2023-08-12 ASSESSMENT — PAIN DESCRIPTION - PAIN TYPE
TYPE: ACUTE PAIN

## 2023-08-12 NOTE — PROGRESS NOTES
0145- Patient admited to postpartum from L&D via wheelchair. IV patent and infusing second bag of LR with pitocin. Fundus firm at umbilicus with scant lochia. VSS. Oriented to surroundings and use of call light,bulb syringe and on filling out birth certificate and EPDS forms.Instructed patient to call for assistance to toilet or for any needs. Denies pain at this time.

## 2023-08-12 NOTE — DISCHARGE PLANNING
Discharge Planning Assessment Post Partum    Reason for Referral: Hx of anxiety/depression and THC  Address: [address] [place] [address]  Type of Living Situation: Lives with spouse  Mom Diagnosis: vaginal delivery   Baby Diagnosis:  37w5d  Primary Language: english    Name of Baby: Avril Reilly   Father of the Baby: Amanuel Lucas  Involved in baby’s care?  Yes  Contact Information: [phone]    Prenatal Care: yes  Mom's PCP: Priya Castillo  PCP for new baby: [facility]    Support System: MOB reports good support through family  Coping/Bonding between mother & baby: yes  Source of Feeding: breastfeeding  Supplies for Infant: POB report being prepared for infant    Mom's Insurance: HTH  Baby Covered on Insurance:yes  Mother Employed/School: Works @ [employer]  Other children in the home/names & ages: first child    Financial Hardship/Income: No   Mom's Mental status: Appropriate  Services used prior to admit: none    CPS History: None  Psychiatric History: MOB has hx of depression and is currently on medication. MOB plans on continuing medications and is also established with a virtual therapist. Discussed PPD and encouraged MOB to reach out if feeling heightened anxiety or depression. Provided MOB with list of therapists who specialize in  mental health.   Domestic Violence History: none   Drug/ETOH History: infants UDS was negative    Resources Provided: PPD resource list      Clearance for Discharge: Infant is cleared to d/c with MOB

## 2023-08-12 NOTE — CARE PLAN
The patient is Stable - Low risk of patient condition declining or worsening    Shift Goals  Clinical Goals: VSS,light lochia,ambulate and void without difficulty  Patient Goals: rest,breast feed  Family Goals: rest,breast feed    Progress made toward(s) clinical / shift goals:  progressing    Patient is not progressing towards the following goals:

## 2023-08-12 NOTE — PROGRESS NOTES
Assessment done. Pt.medicated with tylenol for pain.Fundus firm.Lochia light. Fob at bedside,supportive.Pt. Caring for baby with good skill. Breastfeeding going well.

## 2023-08-12 NOTE — CARE PLAN
The patient is Stable - Low risk of patient condition declining or worsening    Shift Goals  Clinical Goals: stable vitals,lochia wnl  Patient Goals: work on baby care and breastfeeding  Family Goals: support    Progress made toward(s) clinical / shift goals:    Problem: Pain - Standard  Goal: Alleviation of pain or a reduction in pain to the patient’s comfort goal  Outcome: Progressing pain medication effective with pain controll     Problem: Knowledge Deficit - Postpartum  Goal: Patient will verbalize and demonstrate understanding of self and infant care  Outcome: Progressing- patient attentive to infant needs       Patient is not progressing towards the following goals:

## 2023-08-12 NOTE — PROGRESS NOTES
"PPD#1    S: Doing well. Pain controlled. Lochia normal. Denies HA/RUQ pain/scotoma.     O: BP (!) 144/86   Pulse 97   Temp 36.2 °C (97.1 °F) (Temporal)   Resp 18   Ht 1.575 m (5' 2\")   Wt 90.8 kg (200 lb 3.2 oz)   SpO2 95%   BP Range: 114-156/61-96  Gen: NAD  Fundus firm below umbilicus  Ext: no c/c/e    Labs: RH+, RI, GBS neg  Recent Labs     08/10/23  1142 23  0612   WBC 9.2 15.8*   RBC 4.17* 3.54*   HEMOGLOBIN 12.7 11.0*   HEMATOCRIT 38.1 31.7*   MCV 91.4 89.5   MCH 30.5 31.1   RDW 45.8 45.2   PLATELETCT 319 251   MPV 10.1 9.8   NEUTSPOLYS 76.00*  --    LYMPHOCYTES 16.60*  --    MONOCYTES 5.80  --    EOSINOPHILS 0.70  --    BASOPHILS 0.40  --     bc    A/P 36yo , s/p   GHTN  AMA  Factor V Leiden heterozygote  Depression    Routine pp care  GHTN: Mild range BP. Asymptomatic. Continue expectant management   Factor V Leiden heterozygote: prophylactic lovenox x 6 w  Depression: continue home meds  Anemia: Due to acute blood loss. Asymptomatic.     April Rausch MD   "

## 2023-08-12 NOTE — PROCEDURES
Labor and Delivery  Delivery Note     Preoperative Diagnosis:   36yo  @ 37w5d  AMA  Gestational HTN  Heterozygote Factor V Leiden  Depression  Unwanted skin tags     Procedure:   S/p   Repair of 2nd degree laceration  Removal of 3 perineal skin tags    Postoperative Diagnosis:   S/p   AMA  Gestational HTN  Heterozygote Factor V Leiden  Depression  Unwanted skin tags     Primary OB: Jody Gudino MD  Delivering OB: April Rausch MD    Anesthesia: Epidural    Labor Course:   Lissette is a 37-year-old  2 para 0 who presented at 37 weeks for gestational hypertension.  She received 1 dose of Cytotec and then had placement of a Cook catheter balloon.  She was augmented with Pitocin.  She underwent placement of an epidural and underwent amniotomy to reveal clear fluid.  She progressed to complete    Procedure Note:   She pushed well to deliver baby girl OA over an intact perineum.  The right anterior shoulder delivered out difficulty and baby was placed on maternal stomach where she was immediately vigorous.  Cord was all to pulsate for 1 minute at which point it was clamped and cut.  Pitocin was initiated.  The placenta delivered easily and intact under gentle manual traction.  Her fundus was firm and midline.  She had a second-degree perineal laceration which was pared in the usual fashion using 3-0 Vicryl.  She had 3 perineal skin tags on her right perineum that she desired removal of.  These were injected subcutaneously with 1% lidocaine and removed using a scalpel interrupted sutures of 4-0 Vicryl were placed to provide adequate hemostasis and reapproximate the edges.  During this process she did have some uterine atony which resolved with fundal massage.  Additionally Cytotec was placed    Findings:   Baby girl at 2206.  Apgars 8 and 8.  Weight currently pending.  Named Mackenzie  Normal placenta with three-vessel cord  Clear amniotic fluid    Medications:   Pitocin 10 units  Lidocaine 1% 5 cc  subcutaneously  Cytotec 800 mcg per rectum    EBL: 565cc    Disposition: Stable to postpartum and  nursery    April Rausch MD    Statement Selected

## 2023-08-12 NOTE — CARE PLAN
The patient is Stable - Low risk of patient condition declining or worsening    Shift Goals  Clinical Goals: cervical dilation  Patient Goals: have a baby  Family Goals: support    Progress made toward(s) clinical / shift goals:    Problem: Risk for Injury  Goal: Patient and fetus will be free of preventable injury/complications  Outcome: Progressing  Note: Continuous fetal monitoring and contraction monitoring in place.      Problem: Pain  Goal: Patient's pain will be alleviated or reduced to the patient’s comfort goal  Outcome: Progressing  Note: Patient comfortable with epidural at this time and knows when to notify nurse if pain intervention is needed       Patient is not progressing towards the following goals:

## 2023-08-12 NOTE — ANESTHESIA POSTPROCEDURE EVALUATION
Patient: Lissette Reilly    Procedure Summary     Date: 08/11/23 Room / Location:     Anesthesia Start: 0908 Anesthesia Stop: 2206    Procedure: Labor Epidural Diagnosis:     Scheduled Providers:  Responsible Provider: Nickolas Alston M.D.    Anesthesia Type: epidural ASA Status: 2          Final Anesthesia Type: epidural  Last vitals  BP   Blood Pressure: 114/61    Temp   36.9 °C (98.5 °F)    Pulse   67   Resp   15    SpO2   94 %      Anesthesia Post Evaluation    Patient location during evaluation: floor  Patient participation: complete - patient participated  Level of consciousness: awake and alert  Pain score: 0    Airway patency: patent  Anesthetic complications: no  Cardiovascular status: hemodynamically stable  Respiratory status: acceptable  Hydration status: euvolemic    PONV: none          No notable events documented.

## 2023-08-12 NOTE — PROGRESS NOTES
1900 Report received from Cathryn MCKINNON, POC discussed, all patient questions answered.   Working MD at bedside SVE /1, new orders received     Working MD notified of fetal decelerations     Working MD at bedside SVE completed. Orders received to begin pushing.       Working MD at bedside for delivery     delivery of viable female infant, APGAR 8/8, infant placed skin to skin with mother    0140 report given to REBECA Avery discussed, care relinquished.

## 2023-08-12 NOTE — ANESTHESIA TIME REPORT
Anesthesia Start and Stop Event Times     Date Time Event    8/11/2023 0908 Ready for Procedure     0908 Anesthesia Start     2206 Anesthesia Stop        Responsible Staff  08/11/23    Name Role Begin End    Nickolas Alston M.D. Anesth 0908 2206        Overtime Reason:  no overtime (within assigned shift)    Comments:

## 2023-08-12 NOTE — DISCHARGE SUMMARY
"Discharge Summary    Admission Date: 8/10/23    Discharge Date: 23    Diagnosis:Principal Problem:    Gestational hypertension without significant proteinuria during pregnancy in third trimester, antepartum  Active Problems:     (spontaneous vaginal delivery)    Subjective: Pain controlled. Normal lochia. Eating, voiding and ambulating without difficulty. Working on breast feeding.    /72   Pulse 72   Temp 36.9 °C (98.4 °F) (Temporal)   Resp 18   Ht 1.575 m (5' 2\")   Wt 90.8 kg (200 lb 3.2 oz)   LMP 2022 (Exact Date)   SpO2 96%   Breastfeeding Yes   BMI 36.62 kg/m²     GEN: NAD  GI:soft, NT, ND  :fundus firm and below umbilicus  EXT:no edema    Hospital Course: Patient is a 37-year-old G2, P0 who was admitted at 37 weeks and 5 days with gestational hypertension.  Her laboratory evaluation was unremarkable.  She was asymptomatic.  She is status post  of a viable female infant with Apgars of 8 and 8 on 2023.  EBL 565cc. Second-degree perineal laceration repaired.  Postpartum, her blood pressures remained nonsevere range elevated.  She was started on Lovenox for a history of factor V Leiden mutation with a family history of DVT.  She was meeting all postpartum goals and was stable for discharge home on postpartum day 2.    Discharge Instructions   1. Diet : general  2. Activity: Pelvic rest for 6 weeks    Current Outpatient Medications   Medication Sig Dispense Refill    [START ON 2023] enoxaparin (LOVENOX) 40 MG/0.4ML Solution Prefilled Syringe inj Inject 40 mg under the skin every day at 6 PM. 45 Each 0    ibuprofen (MOTRIN) 800 MG Tab Take 1 Tablet by mouth every 8 hours as needed for Moderate Pain. 30 Tablet 0     Follow up: 6 weeks    Complications:none    Jody Gudino M.D.    "

## 2023-08-13 ENCOUNTER — PHARMACY VISIT (OUTPATIENT)
Dept: PHARMACY | Facility: MEDICAL CENTER | Age: 38
End: 2023-08-13
Payer: COMMERCIAL

## 2023-08-13 VITALS
RESPIRATION RATE: 14 BRPM | TEMPERATURE: 98.2 F | HEIGHT: 62 IN | SYSTOLIC BLOOD PRESSURE: 136 MMHG | BODY MASS INDEX: 36.84 KG/M2 | HEART RATE: 77 BPM | WEIGHT: 200.2 LBS | OXYGEN SATURATION: 98 % | DIASTOLIC BLOOD PRESSURE: 79 MMHG

## 2023-08-13 PROCEDURE — A9270 NON-COVERED ITEM OR SERVICE: HCPCS | Performed by: OBSTETRICS & GYNECOLOGY

## 2023-08-13 PROCEDURE — 700111 HCHG RX REV CODE 636 W/ 250 OVERRIDE (IP): Mod: JZ | Performed by: OBSTETRICS & GYNECOLOGY

## 2023-08-13 PROCEDURE — 700102 HCHG RX REV CODE 250 W/ 637 OVERRIDE(OP): Performed by: OBSTETRICS & GYNECOLOGY

## 2023-08-13 RX ORDER — ENOXAPARIN SODIUM 100 MG/ML
40 INJECTION SUBCUTANEOUS DAILY
Status: DISCONTINUED | OUTPATIENT
Start: 2023-08-13 | End: 2023-08-13 | Stop reason: HOSPADM

## 2023-08-13 RX ADMIN — ESCITSLOPRAM 5 MG: 5 TABLET ORAL at 06:04

## 2023-08-13 RX ADMIN — DOCUSATE SODIUM 100 MG: 100 CAPSULE, LIQUID FILLED ORAL at 08:35

## 2023-08-13 RX ADMIN — ACETAMINOPHEN 1000 MG: 500 TABLET, FILM COATED ORAL at 14:46

## 2023-08-13 RX ADMIN — ACETAMINOPHEN 1000 MG: 500 TABLET, FILM COATED ORAL at 08:37

## 2023-08-13 RX ADMIN — PRENATAL WITH FERROUS FUM AND FOLIC ACID 1 TABLET: 3080; 920; 120; 400; 22; 1.84; 3; 20; 10; 1; 12; 200; 27; 25; 2 TABLET ORAL at 07:18

## 2023-08-13 RX ADMIN — ACETAMINOPHEN 975 MG: 325 TABLET, FILM COATED ORAL at 08:35

## 2023-08-13 RX ADMIN — ACETAMINOPHEN 1000 MG: 500 TABLET, FILM COATED ORAL at 01:42

## 2023-08-13 RX ADMIN — ENOXAPARIN SODIUM 40 MG: 100 INJECTION SUBCUTANEOUS at 07:18

## 2023-08-13 ASSESSMENT — PAIN DESCRIPTION - PAIN TYPE: TYPE: ACUTE PAIN

## 2023-08-13 NOTE — PROGRESS NOTES
Assessment done. Pt. Has no c/o pain.Fundus firm.Lochia light. Fob at bedside,supportive.Pt. attentive to baby needs.

## 2023-08-13 NOTE — PROGRESS NOTES
Assessment completed. Plan of care reviewed with patient. Patient will call for pain medication intervention if needed.      0700 Patient and FOB taught how to administer lovenox shot, verbalized understanding and demonstrated administration.

## 2023-08-13 NOTE — LACTATION NOTE
Initial Lactation Consultation:    Met with Lissette and her new baby girl, Avril. She reports breastfeeding to be going well, though she is experiencing some nipple discomfort.     Infant presents with feeding cues at this time; she is placed skin-to-skin with her mother. Hand expression demonstrated with easily expressible colostrum. Lactation consultant assisted with latch onto left breast, using side-lying hold. Latch sustained. Infant visualized to have rhythmic suck pattern at breast. Mother of baby reports improved latch depth with this positioning, and denies any sharp or pinching pain.     feeding and voiding/stooling patterns reviewed. Frequent skin-to-skin and cue-based feeding is encouraged; at least 8 feeds per 24 hours. Reviewed the milk making process, inclusive of supply and demand. Discussed signs of deep, asymmetric latch, and the importance of maintaining good latch to avoid pain/nipple damage and maximize milk transfer. Lissette is to offer both breasts at each feeding, and baby should be allowed to self-limit time at breast. Discouraged introduction of artificial nipples during first 4 weeks, unless medically indicated. Nipple care reviewed.    Feeding plan:     Continue with cue-based breastfeeding, at least once every three hours, as above. Focus on achieving a deep latch for entire duration of each feeding.    Lissette is provided with the opportunity to ask questions. These have been answered to her satisfaction. She is encouraged to call RN/lactation for additional breastfeeding assistance, as needed, throughout remainder of hospital stay.       Encouraged follow up with [facility] for outpatient lactation support (referral sent).     [facility] Breastfeeding Resource list provided.

## 2023-08-13 NOTE — DISCHARGE INSTRUCTIONS

## 2023-08-14 NOTE — LACTATION NOTE
"Follow up lactation visit:    Met with Yolanda and Mackenzie to follow up on breastfeeding. Yolanda reports that Avril cluster fed overnight, with some poor latches that have led to increased nipple/areolar discomfort. Breast evaluation shows several areas of suction damage on the areola where Avril had \"missed her alvaro\" and latched to the areola adjacent to the nipple. The right breast is more tender than the left. We discussed the possibility of Yolanda pumping the right breast, if she is unable to tolerate Mackenzie latching. We also reviewed that, when latched well, continued sharp pain should not be present. Sore nipple care reviewed.    Yolanda brought her [employer] personal double electric pump to the hospital, which we fitted with 22.5mm flanges. Appropriate pumping technique demonstrated.    Yolanda feels that she would like try and latch Mackenzie to the right breast, prior to implementing a pumping plan.    She is able to  latch Mackenzie to the right breast in clutch hold, with only mild discomfort, which diminishes as feed progresses.    Feeding Plan:    Continue with cue-based breastfeeding, at least once every three hours, for a total of 8+ feedings per 24 hours. Focus on achieving a deep latch (utilize partner's assistance to ensure correct positioning).     If Yolanda's breasts are too tender to breastfeed, discussed the option of pumping with double electric breast pump (every three hours) and feeding back/supplementing Avril with a combination of expressed milk and formula (if mother's milk is unavailable). Supplemental feeding guidelines reviewed. Close follow up with outpatient lactation is encouraged if Yolanda decided to switch mode of infant feeding.    Yolanda is provided with the opportunity to ask questions. These have been answered to her satisfaction. She is encouraged to call RN/lactation for additional breastfeeding assistance, as needed, throughout remainder of hospital stay.       Yolanda is encouraged to follow " up with [facility] for outpatient lactation support.

## 2023-08-14 NOTE — PROGRESS NOTES
Discharge instructions reviewed by patient.  screen, patient and baby f/u appts also reviewed by patient . Car seat present. Birth certificate done.  patient to  medications from the pharmacy. Bands matched and security tag removed.Mom and baby ready for discharge home.

## 2024-06-03 ENCOUNTER — HOSPITAL ENCOUNTER (OUTPATIENT)
Facility: MEDICAL CENTER | Age: 39
End: 2024-06-03
Attending: STUDENT IN AN ORGANIZED HEALTH CARE EDUCATION/TRAINING PROGRAM
Payer: COMMERCIAL

## 2024-06-03 ENCOUNTER — APPOINTMENT (OUTPATIENT)
Dept: MEDICAL GROUP | Facility: IMAGING CENTER | Age: 39
End: 2024-06-03
Payer: COMMERCIAL

## 2024-06-03 ENCOUNTER — HOSPITAL ENCOUNTER (OUTPATIENT)
Dept: LAB | Facility: MEDICAL CENTER | Age: 39
End: 2024-06-03
Attending: STUDENT IN AN ORGANIZED HEALTH CARE EDUCATION/TRAINING PROGRAM
Payer: COMMERCIAL

## 2024-06-03 VITALS
TEMPERATURE: 97.9 F | OXYGEN SATURATION: 97 % | DIASTOLIC BLOOD PRESSURE: 80 MMHG | HEART RATE: 66 BPM | RESPIRATION RATE: 16 BRPM | BODY MASS INDEX: 29.26 KG/M2 | SYSTOLIC BLOOD PRESSURE: 126 MMHG | HEIGHT: 62 IN | WEIGHT: 159 LBS

## 2024-06-03 DIAGNOSIS — J02.9 PHARYNGITIS, UNSPECIFIED ETIOLOGY: ICD-10-CM

## 2024-06-03 LAB — HETEROPH AB SER QL: NEGATIVE

## 2024-06-03 PROCEDURE — 87081 CULTURE SCREEN ONLY: CPT

## 2024-06-03 PROCEDURE — 3074F SYST BP LT 130 MM HG: CPT | Performed by: STUDENT IN AN ORGANIZED HEALTH CARE EDUCATION/TRAINING PROGRAM

## 2024-06-03 PROCEDURE — 99213 OFFICE O/P EST LOW 20 MIN: CPT | Performed by: STUDENT IN AN ORGANIZED HEALTH CARE EDUCATION/TRAINING PROGRAM

## 2024-06-03 PROCEDURE — 36415 COLL VENOUS BLD VENIPUNCTURE: CPT

## 2024-06-03 PROCEDURE — 86308 HETEROPHILE ANTIBODY SCREEN: CPT

## 2024-06-03 PROCEDURE — 3079F DIAST BP 80-89 MM HG: CPT | Performed by: STUDENT IN AN ORGANIZED HEALTH CARE EDUCATION/TRAINING PROGRAM

## 2024-06-03 ASSESSMENT — PATIENT HEALTH QUESTIONNAIRE - PHQ9: CLINICAL INTERPRETATION OF PHQ2 SCORE: 0

## 2024-06-03 ASSESSMENT — FIBROSIS 4 INDEX: FIB4 SCORE: 0.71

## 2024-06-03 NOTE — PROGRESS NOTES
"Subjective:     CC:   Chief Complaint   Patient presents with    Pharyngitis     \"Sporadic sore throat\" for 1 month, was hurting last week, but no pain today       HPI:   Lissette Reillykareem, 38 y.o., female is new to me and presents today to discuss:     Sore throat: reports she was sick with URI a month ago. She was experiencing  sore throat, cough, and congestion; she was given Cephalexin for 10 days. Reports all her symptoms subsided except for sore throat.  She has been experiencing a sore throat daily worse in the mornings and at night.  She took dayquil and throat lozenges which provided temporary relief. She tested negative for strep via rapid strep test through [facility]. She's currently breastfeeding. Denies smoking or vaping. Denies history of allergies.     ROS:  See HPI    Medications, allergies, past medical history, family history, surgical history, and social history documented in chart and reviewed by me.       Objective:   Exam:  /80 (BP Location: Left arm, Patient Position: Sitting, BP Cuff Size: Adult)   Pulse 66   Temp 36.6 °C (97.9 °F) (Temporal)   Resp 16   Ht 1.575 m (5' 2\")   Wt 72.1 kg (159 lb)   SpO2 97%   BMI 29.08 kg/m²      Physical Exam  Vitals reviewed.   Constitutional:       General: She is not in acute distress.     Appearance: Normal appearance. She is not ill-appearing or toxic-appearing.   HENT:      Head: Normocephalic and atraumatic.      Right Ear: Tympanic membrane, ear canal and external ear normal.      Left Ear: Tympanic membrane, ear canal and external ear normal.      Nose: No congestion or rhinorrhea.      Mouth/Throat:      Mouth: Mucous membranes are moist.      Pharynx: Oropharynx is clear. No oropharyngeal exudate or posterior oropharyngeal erythema.      Tonsils: No tonsillar exudate.   Eyes:      General: No scleral icterus.        Right eye: No discharge.         Left eye: No discharge.      Conjunctiva/sclera: Conjunctivae " normal.      Pupils: Pupils are equal, round, and reactive to light.   Neck:      Thyroid: No thyroid mass or thyromegaly.   Cardiovascular:      Rate and Rhythm: Normal rate and regular rhythm.      Pulses: Normal pulses.      Heart sounds: Normal heart sounds. No murmur heard.  Pulmonary:      Effort: Pulmonary effort is normal. No respiratory distress.      Breath sounds: Normal breath sounds. No wheezing.   Abdominal:      General: Bowel sounds are normal. There is no distension.      Palpations: Abdomen is soft. There is no mass.      Tenderness: There is no abdominal tenderness.   Musculoskeletal:         General: Normal range of motion.      Cervical back: Normal range of motion and neck supple. No tenderness.   Lymphadenopathy:      Cervical: No cervical adenopathy.   Skin:     General: Skin is warm and dry.      Coloration: Skin is not jaundiced.   Neurological:      General: No focal deficit present.      Mental Status: She is alert and oriented to person, place, and time.      Gait: Gait normal.   Psychiatric:         Mood and Affect: Mood normal.         Behavior: Behavior normal.         Thought Content: Thought content normal.         Judgment: Judgment normal.              Assessment & Plan:       1. Pharyngitis, unspecified etiology  Acute and ongoing.  Patient has been experiencing a daily sore throat for the last month.  Patient tested negative for rapid strep test when her symptoms started a month ago. She competed Cephalexin x 10 days.  Normal exam today.  Vital signs are stable.  Strep culture conducted today.  Will rule out mononucleosis. Consider short-term oral steroids (if safe while breast-feeding) if symptoms persist and/or referral to ENT.  Discussed conservative treatment such as increase fluid intake, throat lozenges, gargles with salt water, and teas with honey and lemon.  Patient will follow-up with her PCP as scheduled on July 12.  Patient agreed with treatment plan and verbalized  understanding.  - Beta Strep Screen (Gp. A); Future  - MONONUCLEOSIS TEST QUAL; Future         Return for as scheduled with PCP 7/12 or sooner if needed.     Please note that this dictation was created using voice recognition software. I have made every reasonable attempt to correct obvious errors, but I expect that there are errors of grammar and possibly content that I did not discover before finalizing the note.    Mercedes Puga PA-C  [facility]

## 2024-06-03 NOTE — PATIENT INSTRUCTIONS
Thank you for choosing [facility]. It was a pleasure meeting you today.     Take care!  Mercedes [facility]

## 2024-06-04 DIAGNOSIS — J02.9 PHARYNGITIS, UNSPECIFIED ETIOLOGY: ICD-10-CM

## 2024-06-06 LAB
S PYO SPEC QL CULT: NORMAL
SIGNIFICANT IND 70042: NORMAL
SITE SITE: NORMAL
SOURCE SOURCE: NORMAL

## 2024-07-12 ENCOUNTER — APPOINTMENT (OUTPATIENT)
Dept: MEDICAL GROUP | Facility: IMAGING CENTER | Age: 39
End: 2024-07-12
Payer: COMMERCIAL

## 2024-09-15 ENCOUNTER — OFFICE VISIT (OUTPATIENT)
Dept: URGENT CARE | Facility: CLINIC | Age: 39
End: 2024-09-15
Payer: COMMERCIAL

## 2024-09-15 VITALS
SYSTOLIC BLOOD PRESSURE: 118 MMHG | TEMPERATURE: 97.4 F | BODY MASS INDEX: 27.6 KG/M2 | HEART RATE: 98 BPM | RESPIRATION RATE: 20 BRPM | WEIGHT: 150 LBS | DIASTOLIC BLOOD PRESSURE: 74 MMHG | HEIGHT: 62 IN | OXYGEN SATURATION: 100 %

## 2024-09-15 DIAGNOSIS — H66.001 NON-RECURRENT ACUTE SUPPURATIVE OTITIS MEDIA OF RIGHT EAR WITHOUT SPONTANEOUS RUPTURE OF TYMPANIC MEMBRANE: ICD-10-CM

## 2024-09-15 PROCEDURE — 99213 OFFICE O/P EST LOW 20 MIN: CPT | Performed by: PHYSICIAN ASSISTANT

## 2024-09-15 PROCEDURE — 3078F DIAST BP <80 MM HG: CPT | Performed by: PHYSICIAN ASSISTANT

## 2024-09-15 PROCEDURE — 3074F SYST BP LT 130 MM HG: CPT | Performed by: PHYSICIAN ASSISTANT

## 2024-09-15 ASSESSMENT — ENCOUNTER SYMPTOMS
COUGH: 1
SINUS PAIN: 1
HEADACHES: 1
FEVER: 1

## 2024-09-15 ASSESSMENT — FIBROSIS 4 INDEX: FIB4 SCORE: 0.72

## 2024-09-15 NOTE — PROGRESS NOTES
"Subjective     Lissette Reilly is a 39 y.o. female who presents with Ear Fullness (X2 days, right ear, \" I have has an excessive amount of green mucus for a few days as well.\" )    HPI:  Lissette Reilly is a 39 y.o. female who presents today for concerns of possible ear infection in her right ear.  Patient reports that she started to get sick 1 week ago with URI symptoms.  She has had fairly persistent congestion and sinus pressure but a few days ago started to notice fullness, muffled hearing, and discomfort in her right ear.  She has had some fullness in her left ear as well but no pain.  Right ear got much more painful yesterday and has not improved today.  No discharge from the ear.  She had fever earlier in the week when her symptoms first started but has not had any fever for the last 72 hours.        Review of Systems   Constitutional:  Positive for fever and malaise/fatigue.   HENT:  Positive for congestion, ear pain, hearing loss and sinus pain.    Respiratory:  Positive for cough.    Neurological:  Positive for headaches.           PMH:  has a past medical history of Depression and Factor V Leiden carrier (HCC).  MEDS:   Current Outpatient Medications:     amoxicillin-clavulanate (AUGMENTIN) 875-125 MG Tab, Take 1 Tablet by mouth 2 times a day for 10 days., Disp: 20 Tablet, Rfl: 0    escitalopram (LEXAPRO) 5 MG tablet, TAKE 1 TABLET BY MOUTH EVERY DAY, Disp: 30 Tablet, Rfl: 0  ALLERGIES: No Known Allergies  SURGHX: No past surgical history on file.  SOCHX:  reports that she has never smoked. She has never used smokeless tobacco. She reports current alcohol use. She reports current drug use. Frequency: 2.00 times per week. Drugs: Marijuana, Inhaled, and Oral.  FH: Family history was reviewed, no pertinent findings to report      Objective     /74 (BP Location: Left arm, Patient Position: Sitting, BP Cuff Size: Adult)   Pulse 98   Temp 36.3 °C (97.4 °F) (Temporal)   Resp 20   Ht 1.575 m " "(5' 2\")   Wt 68 kg (150 lb) Comment: Pt stated  LMP 09/01/2024 (Approximate)   SpO2 100%   Breastfeeding No   BMI 27.44 kg/m²      Physical Exam  Constitutional:       Appearance: She is well-developed.   HENT:      Head: Normocephalic and atraumatic.      Right Ear: Ear canal and external ear normal. Tenderness present. Tympanic membrane is erythematous and bulging. Tympanic membrane is not perforated.      Left Ear: Tympanic membrane, ear canal and external ear normal.      Ears:      Comments: Right TM is erythematous and dull with significant bulging of the TM.  No perforation or discharge.  Eyes:      Conjunctiva/sclera: Conjunctivae normal.      Pupils: Pupils are equal, round, and reactive to light.   Cardiovascular:      Rate and Rhythm: Normal rate and regular rhythm.      Heart sounds: Normal heart sounds. No murmur heard.  Pulmonary:      Effort: Pulmonary effort is normal.      Breath sounds: Normal breath sounds. No wheezing.   Musculoskeletal:      Cervical back: Normal range of motion.   Lymphadenopathy:      Head:      Right side of head: Posterior auricular adenopathy present.      Cervical: Cervical adenopathy present.      Right cervical: Superficial cervical adenopathy present.   Skin:     General: Skin is warm and dry.      Capillary Refill: Capillary refill takes less than 2 seconds.   Neurological:      Mental Status: She is alert and oriented to person, place, and time.   Psychiatric:         Behavior: Behavior normal.         Judgment: Judgment normal.             Assessment & Plan     1. Non-recurrent acute suppurative otitis media of right ear without spontaneous rupture of tympanic membrane  - amoxicillin-clavulanate (AUGMENTIN) 875-125 MG Tab; Take 1 Tablet by mouth 2 times a day for 10 days.  Dispense: 20 Tablet; Refill: 0  Recommend use of an oral decongestant.  Also recommend use of OTC antihistamine such as Zyrtec or Claritin once daily as well as OTC Flonase once daily.  She " can also utilize supportive care measures to include the use of saline nasal rinses, steam inhalation, use of a cool-mist humidifier in the bedroom at night.  Increase analgesics to help with pain.  If no significant improvement in symptoms after being on the antibiotic for 72 hours or if she starts to notice any discharge from the ear I would recommend that she return for reevaluation.          Differential Diagnosis, natural history, and supportive care discussed. Return to the Urgent Care or follow up with your PCP if symptoms fail to resolve, or for any new or worsening symptoms. Emergency room precautions discussed. Patient and/or family appears understanding of information.

## 2024-10-02 PROCEDURE — RXMED WILLOW AMBULATORY MEDICATION CHARGE: Performed by: INTERNAL MEDICINE

## 2024-10-03 ENCOUNTER — PHARMACY VISIT (OUTPATIENT)
Dept: PHARMACY | Facility: MEDICAL CENTER | Age: 39
End: 2024-10-03
Payer: COMMERCIAL

## 2024-10-15 ENCOUNTER — TELEMEDICINE (OUTPATIENT)
Dept: MEDICAL GROUP | Facility: IMAGING CENTER | Age: 39
End: 2024-10-15
Payer: COMMERCIAL

## 2024-10-15 ENCOUNTER — TELEPHONE (OUTPATIENT)
Dept: MEDICAL GROUP | Facility: IMAGING CENTER | Age: 39
End: 2024-10-15
Payer: COMMERCIAL

## 2024-10-15 VITALS
TEMPERATURE: 98 F | SYSTOLIC BLOOD PRESSURE: 122 MMHG | HEIGHT: 62 IN | HEART RATE: 88 BPM | DIASTOLIC BLOOD PRESSURE: 80 MMHG | WEIGHT: 153 LBS | OXYGEN SATURATION: 99 % | BODY MASS INDEX: 28.16 KG/M2 | RESPIRATION RATE: 16 BRPM

## 2024-10-15 DIAGNOSIS — F41.9 ANXIETY: ICD-10-CM

## 2024-10-15 DIAGNOSIS — Z13.220 SCREENING CHOLESTEROL LEVEL: ICD-10-CM

## 2024-10-15 DIAGNOSIS — Z13.0 SCREENING FOR DEFICIENCY ANEMIA: ICD-10-CM

## 2024-10-15 DIAGNOSIS — F33.0 MILD EPISODE OF RECURRENT MAJOR DEPRESSIVE DISORDER (HCC): ICD-10-CM

## 2024-10-15 DIAGNOSIS — Z13.1 DIABETES MELLITUS SCREENING: ICD-10-CM

## 2024-10-15 DIAGNOSIS — N92.6 IRREGULAR MENSES: ICD-10-CM

## 2024-10-15 DIAGNOSIS — Z13.29 SCREENING FOR THYROID DISORDER: ICD-10-CM

## 2024-10-15 PROBLEM — F33.2 SEVERE EPISODE OF RECURRENT MAJOR DEPRESSIVE DISORDER, WITHOUT PSYCHOTIC FEATURES (HCC): Status: ACTIVE | Noted: 2020-01-26

## 2024-10-15 PROCEDURE — 99214 OFFICE O/P EST MOD 30 MIN: CPT | Performed by: PHYSICIAN ASSISTANT

## 2024-10-15 RX ORDER — ALPRAZOLAM 0.25 MG/1
0.25 TABLET ORAL NIGHTLY PRN
Qty: 28 TABLET | Refills: 0 | Status: SHIPPED | OUTPATIENT
Start: 2024-10-15 | End: 2024-10-15

## 2024-10-15 RX ORDER — ESCITALOPRAM OXALATE 10 MG/1
10 TABLET ORAL DAILY
Qty: 90 TABLET | Refills: 0 | Status: SHIPPED | OUTPATIENT
Start: 2024-10-15

## 2024-10-15 RX ORDER — ALPRAZOLAM 0.25 MG/1
0.25 TABLET ORAL 2 TIMES DAILY PRN
Qty: 28 TABLET | Refills: 0 | Status: SHIPPED | OUTPATIENT
Start: 2024-10-15 | End: 2024-10-29

## 2024-10-15 ASSESSMENT — ANXIETY QUESTIONNAIRES
2. NOT BEING ABLE TO STOP OR CONTROL WORRYING: SEVERAL DAYS
5. BEING SO RESTLESS THAT IT IS HARD TO SIT STILL: SEVERAL DAYS
1. FEELING NERVOUS, ANXIOUS, OR ON EDGE: MORE THAN HALF THE DAYS
7. FEELING AFRAID AS IF SOMETHING AWFUL MIGHT HAPPEN: MORE THAN HALF THE DAYS
GAD7 TOTAL SCORE: 10
3. WORRYING TOO MUCH ABOUT DIFFERENT THINGS: SEVERAL DAYS
6. BECOMING EASILY ANNOYED OR IRRITABLE: SEVERAL DAYS
4. TROUBLE RELAXING: MORE THAN HALF THE DAYS

## 2024-10-15 ASSESSMENT — PAIN SCALES - GENERAL: PAINLEVEL: NO PAIN

## 2024-10-15 ASSESSMENT — PATIENT HEALTH QUESTIONNAIRE - PHQ9
SUM OF ALL RESPONSES TO PHQ QUESTIONS 1-9: 7
CLINICAL INTERPRETATION OF PHQ2 SCORE: 3
5. POOR APPETITE OR OVEREATING: 1 - SEVERAL DAYS

## 2024-10-15 ASSESSMENT — FIBROSIS 4 INDEX: FIB4 SCORE: 0.72

## 2024-10-17 ENCOUNTER — HOSPITAL ENCOUNTER (OUTPATIENT)
Dept: LAB | Facility: MEDICAL CENTER | Age: 39
End: 2024-10-17
Attending: PHYSICIAN ASSISTANT
Payer: COMMERCIAL

## 2024-10-17 DIAGNOSIS — Z13.1 DIABETES MELLITUS SCREENING: ICD-10-CM

## 2024-10-17 DIAGNOSIS — Z13.220 SCREENING CHOLESTEROL LEVEL: ICD-10-CM

## 2024-10-17 DIAGNOSIS — Z13.29 SCREENING FOR THYROID DISORDER: ICD-10-CM

## 2024-10-17 DIAGNOSIS — N92.6 IRREGULAR MENSES: ICD-10-CM

## 2024-10-17 DIAGNOSIS — Z13.0 SCREENING FOR DEFICIENCY ANEMIA: ICD-10-CM

## 2024-10-17 LAB
ALBUMIN SERPL BCP-MCNC: 4.8 G/DL (ref 3.2–4.9)
ALBUMIN/GLOB SERPL: 1.5 G/DL
ALP SERPL-CCNC: 85 U/L (ref 30–99)
ALT SERPL-CCNC: 14 U/L (ref 2–50)
ANION GAP SERPL CALC-SCNC: 16 MMOL/L (ref 7–16)
AST SERPL-CCNC: 15 U/L (ref 12–45)
BASOPHILS # BLD AUTO: 0.8 % (ref 0–1.8)
BASOPHILS # BLD: 0.05 K/UL (ref 0–0.12)
BILIRUB SERPL-MCNC: 0.7 MG/DL (ref 0.1–1.5)
BUN SERPL-MCNC: 9 MG/DL (ref 8–22)
CALCIUM ALBUM COR SERPL-MCNC: 9.6 MG/DL (ref 8.5–10.5)
CALCIUM SERPL-MCNC: 10.2 MG/DL (ref 8.5–10.5)
CHLORIDE SERPL-SCNC: 102 MMOL/L (ref 96–112)
CHOLEST SERPL-MCNC: 196 MG/DL (ref 100–199)
CO2 SERPL-SCNC: 18 MMOL/L (ref 20–33)
CREAT SERPL-MCNC: 0.74 MG/DL (ref 0.5–1.4)
EOSINOPHIL # BLD AUTO: 0.08 K/UL (ref 0–0.51)
EOSINOPHIL NFR BLD: 1.3 % (ref 0–6.9)
ERYTHROCYTE [DISTWIDTH] IN BLOOD BY AUTOMATED COUNT: 41.9 FL (ref 35.9–50)
ESTRADIOL SERPL-MCNC: 98.6 PG/ML
FSH SERPL-ACNC: 4 MIU/ML
GFR SERPLBLD CREATININE-BSD FMLA CKD-EPI: 105 ML/MIN/1.73 M 2
GLOBULIN SER CALC-MCNC: 3.3 G/DL (ref 1.9–3.5)
GLUCOSE SERPL-MCNC: 83 MG/DL (ref 65–99)
HCT VFR BLD AUTO: 44.9 % (ref 37–47)
HDLC SERPL-MCNC: 81 MG/DL
HGB BLD-MCNC: 15.1 G/DL (ref 12–16)
IMM GRANULOCYTES # BLD AUTO: 0.01 K/UL (ref 0–0.11)
IMM GRANULOCYTES NFR BLD AUTO: 0.2 % (ref 0–0.9)
LDLC SERPL CALC-MCNC: 97 MG/DL
LH SERPL-ACNC: 6.7 IU/L
LYMPHOCYTES # BLD AUTO: 1.63 K/UL (ref 1–4.8)
LYMPHOCYTES NFR BLD: 26.7 % (ref 22–41)
MCH RBC QN AUTO: 30.6 PG (ref 27–33)
MCHC RBC AUTO-ENTMCNC: 33.6 G/DL (ref 32.2–35.5)
MCV RBC AUTO: 91.1 FL (ref 81.4–97.8)
MONOCYTES # BLD AUTO: 0.47 K/UL (ref 0–0.85)
MONOCYTES NFR BLD AUTO: 7.7 % (ref 0–13.4)
NEUTROPHILS # BLD AUTO: 3.86 K/UL (ref 1.82–7.42)
NEUTROPHILS NFR BLD: 63.3 % (ref 44–72)
NRBC # BLD AUTO: 0 K/UL
NRBC BLD-RTO: 0 /100 WBC (ref 0–0.2)
PLATELET # BLD AUTO: 316 K/UL (ref 164–446)
PMV BLD AUTO: 10.2 FL (ref 9–12.9)
POTASSIUM SERPL-SCNC: 4.6 MMOL/L (ref 3.6–5.5)
PROGEST SERPL-MCNC: 25.3 NG/ML
PROLACTIN SERPL-MCNC: 11.4 NG/ML (ref 2.8–26)
PROT SERPL-MCNC: 8.1 G/DL (ref 6–8.2)
RBC # BLD AUTO: 4.93 M/UL (ref 4.2–5.4)
SODIUM SERPL-SCNC: 136 MMOL/L (ref 135–145)
TRIGL SERPL-MCNC: 91 MG/DL (ref 0–149)
TSH SERPL DL<=0.005 MIU/L-ACNC: 1.34 UIU/ML (ref 0.38–5.33)
WBC # BLD AUTO: 6.1 K/UL (ref 4.8–10.8)

## 2024-10-17 PROCEDURE — 85025 COMPLETE CBC W/AUTO DIFF WBC: CPT

## 2024-10-17 PROCEDURE — 84443 ASSAY THYROID STIM HORMONE: CPT

## 2024-10-17 PROCEDURE — 80053 COMPREHEN METABOLIC PANEL: CPT

## 2024-10-17 PROCEDURE — 83002 ASSAY OF GONADOTROPIN (LH): CPT

## 2024-10-17 PROCEDURE — 36415 COLL VENOUS BLD VENIPUNCTURE: CPT

## 2024-10-17 PROCEDURE — 84146 ASSAY OF PROLACTIN: CPT

## 2024-10-17 PROCEDURE — 80061 LIPID PANEL: CPT

## 2024-10-17 PROCEDURE — 84144 ASSAY OF PROGESTERONE: CPT

## 2024-10-17 PROCEDURE — 82670 ASSAY OF TOTAL ESTRADIOL: CPT

## 2024-10-17 PROCEDURE — 83001 ASSAY OF GONADOTROPIN (FSH): CPT

## 2024-10-31 ENCOUNTER — OFFICE VISIT (OUTPATIENT)
Dept: MEDICAL GROUP | Facility: IMAGING CENTER | Age: 39
End: 2024-10-31
Payer: COMMERCIAL

## 2024-10-31 VITALS
DIASTOLIC BLOOD PRESSURE: 70 MMHG | SYSTOLIC BLOOD PRESSURE: 128 MMHG | HEIGHT: 62 IN | BODY MASS INDEX: 28.71 KG/M2 | RESPIRATION RATE: 16 BRPM | TEMPERATURE: 97.8 F | HEART RATE: 99 BPM | WEIGHT: 156 LBS | OXYGEN SATURATION: 97 %

## 2024-10-31 DIAGNOSIS — F41.9 ANXIETY: ICD-10-CM

## 2024-10-31 DIAGNOSIS — F33.42 RECURRENT MAJOR DEPRESSIVE DISORDER, IN FULL REMISSION (HCC): ICD-10-CM

## 2024-10-31 ASSESSMENT — PAIN SCALES - GENERAL: PAINLEVEL: NO PAIN

## 2024-10-31 ASSESSMENT — FIBROSIS 4 INDEX: FIB4 SCORE: 0.51

## 2025-01-08 ENCOUNTER — APPOINTMENT (OUTPATIENT)
Dept: RADIOLOGY | Facility: MEDICAL CENTER | Age: 40
End: 2025-01-08
Attending: OBSTETRICS & GYNECOLOGY
Payer: COMMERCIAL

## 2025-01-10 DIAGNOSIS — F41.9 ANXIETY: ICD-10-CM

## 2025-01-10 DIAGNOSIS — F33.0 MILD EPISODE OF RECURRENT MAJOR DEPRESSIVE DISORDER (HCC): ICD-10-CM

## 2025-01-10 NOTE — TELEPHONE ENCOUNTER
Received request via: Pharmacy    Was the patient seen in the last year in this department? Yes    Does the patient have an active prescription (recently filled or refills available) for medication(s) requested? No    Pharmacy Name: [id number]    Does the patient have [facility] Plus and need 100-day supply? (This applies to ALL medications) Patient does not have SCP

## 2025-01-13 RX ORDER — ESCITALOPRAM OXALATE 10 MG/1
10 TABLET ORAL DAILY
Qty: 90 TABLET | Refills: 1 | Status: SHIPPED | OUTPATIENT
Start: 2025-01-13

## 2025-01-27 ENCOUNTER — HOSPITAL ENCOUNTER (OUTPATIENT)
Dept: RADIOLOGY | Facility: MEDICAL CENTER | Age: 40
End: 2025-01-27
Attending: OBSTETRICS & GYNECOLOGY
Payer: COMMERCIAL

## 2025-01-27 DIAGNOSIS — Z12.31 ENCOUNTER FOR MAMMOGRAM TO ESTABLISH BASELINE MAMMOGRAM: ICD-10-CM

## 2025-01-27 PROCEDURE — 77067 SCR MAMMO BI INCL CAD: CPT

## 2025-02-03 ENCOUNTER — APPOINTMENT (OUTPATIENT)
Dept: MEDICAL GROUP | Facility: IMAGING CENTER | Age: 40
End: 2025-02-03
Payer: COMMERCIAL

## 2025-02-03 VITALS
SYSTOLIC BLOOD PRESSURE: 112 MMHG | TEMPERATURE: 97.8 F | BODY MASS INDEX: 27.23 KG/M2 | RESPIRATION RATE: 16 BRPM | HEART RATE: 87 BPM | WEIGHT: 148 LBS | OXYGEN SATURATION: 99 % | HEIGHT: 62 IN | DIASTOLIC BLOOD PRESSURE: 62 MMHG

## 2025-02-03 DIAGNOSIS — F33.42 RECURRENT MAJOR DEPRESSIVE DISORDER, IN FULL REMISSION (HCC): ICD-10-CM

## 2025-02-03 DIAGNOSIS — D68.51 FACTOR V LEIDEN CARRIER (HCC): ICD-10-CM

## 2025-02-03 DIAGNOSIS — F41.9 ANXIETY: ICD-10-CM

## 2025-02-03 DIAGNOSIS — R92.8 ABNORMAL MAMMOGRAM OF LEFT BREAST: ICD-10-CM

## 2025-02-03 DIAGNOSIS — Z23 NEED FOR VACCINATION: ICD-10-CM

## 2025-02-03 PROCEDURE — 90651 9VHPV VACCINE 2/3 DOSE IM: CPT | Performed by: PHYSICIAN ASSISTANT

## 2025-02-03 PROCEDURE — 3074F SYST BP LT 130 MM HG: CPT | Performed by: PHYSICIAN ASSISTANT

## 2025-02-03 PROCEDURE — 3078F DIAST BP <80 MM HG: CPT | Performed by: PHYSICIAN ASSISTANT

## 2025-02-03 PROCEDURE — 1126F AMNT PAIN NOTED NONE PRSNT: CPT | Performed by: PHYSICIAN ASSISTANT

## 2025-02-03 PROCEDURE — 99214 OFFICE O/P EST MOD 30 MIN: CPT | Mod: 25 | Performed by: PHYSICIAN ASSISTANT

## 2025-02-03 PROCEDURE — 90471 IMMUNIZATION ADMIN: CPT | Performed by: PHYSICIAN ASSISTANT

## 2025-02-03 RX ORDER — PROPRANOLOL HYDROCHLORIDE 10 MG/1
10 TABLET ORAL 3 TIMES DAILY PRN
Qty: 30 TABLET | Refills: 0 | Status: SHIPPED | OUTPATIENT
Start: 2025-02-03

## 2025-02-03 ASSESSMENT — PATIENT HEALTH QUESTIONNAIRE - PHQ9: CLINICAL INTERPRETATION OF PHQ2 SCORE: 0

## 2025-02-03 ASSESSMENT — FIBROSIS 4 INDEX: FIB4 SCORE: 0.51

## 2025-02-03 ASSESSMENT — PAIN SCALES - GENERAL: PAINLEVEL_OUTOF10: NO PAIN

## 2025-02-03 NOTE — ASSESSMENT & PLAN NOTE
Completed mammogram on 1/27/2025 showing: Asymmetry versus mass in the outer left breast.     Has follow-up imaging scheduled for 2/7/2025.

## 2025-02-03 NOTE — PROGRESS NOTES
"Subjective:     CC:   Chief Complaint   Patient presents with    Follow-Up     On medication and mammogram results        HPI:   Lissette presents today to discuss:    Abnormal mammogram of left breast  Completed mammogram on 1/27/2025 showing: Asymmetry versus mass in the outer left breast.     Has follow-up imaging scheduled for 2/7/2025.    Anxiety  Patient admits to breakthrough anxiety due to.  Times per week.  Feels an overall feeling of \"fight or flight mode\".  Has been working with her therapist to work on coping mechanisms.  Otherwise doing well on Lexapro 10 mg daily.  Depression controlled.    Factor V Leiden carrier (HCC)  Family history of factor V Leiden, patient's mother with history of DVT and PE.  Patient without personal history of DVT or PE.    Past Medical History:   Diagnosis Date    Depression     Factor V Leiden carrier (HCC)      Family History   Problem Relation Age of Onset    Cancer Mother         thyroid - papillary    Other Mother         factor v leiden    DVT Mother     Pulmonary Embolism Mother     Cancer Paternal Aunt         breast    Alzheimer's Disease Paternal Aunt 74    Drug abuse Father         opioid    Lung Disease Maternal Grandfather     Stroke Paternal Grandmother      History reviewed. No pertinent surgical history.  Social History     Tobacco Use    Smoking status: Never    Smokeless tobacco: Never   Vaping Use    Vaping status: Never Used   Substance Use Topics    Alcohol use: Yes     Comment: 4-5 weekly    Drug use: Yes     Types: Marijuana, Oral     Comment: Marijuana     Social History     Social History Narrative    Works at [employer] -     From [place], no kids    Activities: golfing, likes traveling, occasional yoga    Diet: no restrictions     Current Outpatient Medications Ordered in Epic   Medication Sig Dispense Refill    propranolol (INDERAL) 10 MG Tab Take 1 Tablet by mouth 3 times a day as needed (anxiety). 30 Tablet 0    " "escitalopram (LEXAPRO) 10 MG Tab TAKE 1 TABLET BY MOUTH EVERY DAY 90 Tablet 1     No current Epic-ordered facility-administered medications on file.     Patient has no known allergies.    PMH/PSH/FH/Social history reviewed.  Vaccinations discussed.  Previous records and labs reviewed. Discussed age appropriate anticipatory guidance.    ROS: see hpi  Gen: no fevers/chills  Pulm: no sob, no cough  CV: no chest pain, no palpitations, no edema  GI: no nausea/vomiting, no diarrhea  Skin: no rash    Objective:   Exam:  /62 (BP Location: Left arm, Patient Position: Sitting, BP Cuff Size: Adult)   Pulse 87   Temp 36.6 °C (97.8 °F) (Temporal)   Resp 16   Ht 1.575 m (5' 2\")   Wt 67.1 kg (148 lb)   LMP 02/03/2025   SpO2 99%   BMI 27.07 kg/m²    Body mass index is 27.07 kg/m².    Gen: Alert and oriented, No apparent distress.  HEENT: Head atraumatic, normocephalic. Pupils equal and round.  Neck: Neck is supple without lymphadenopathy.   Lungs: Normal effort, CTA bilaterally, no wheezes, rhonchi, or rales  CV: Regular rate and rhythm. No murmurs, rubs, or gallops.  Ext: No clubbing, cyanosis, edema.    Assessment & Plan:     39 y.o. female with the following -     1. Anxiety  Acute on chronic.  Will prescribe propranolol for as needed use.  Do not take more than prescribed.  No history of asthma.  - propranolol (INDERAL) 10 MG Tab; Take 1 Tablet by mouth 3 times a day as needed (anxiety).  Dispense: 30 Tablet; Refill: 0    2. Recurrent major depressive disorder, in full remission (HCC)  Chronic, controlled and stable. Continue current regimen -Lexapro 10 mg daily.    3. Abnormal mammogram of left breast  Has spot compression imaging scheduled for 2/7/2025.    4. Factor V Leiden carrier (HCC)  Chronic, controlled and stable.  No personal history of DVT/PE.    5. Need for vaccination  Patient states she had a recent Pap smear that was positive for HPV.  She is interested in the Gardasil vaccination series.  - " Gardasil 9    Return in about 6 months (around 8/6/2025) for Annual physical, 3rd HPV.    Priya Castillo PA-C ([place])  Physician Assistant Certified  [facility]      Please note that this dictation was created using voice recognition software. I have made every reasonable attempt to correct obvious errors, but I expect that there are errors of grammar and possibly content that I did not discover before finalizing the note.

## 2025-02-03 NOTE — ASSESSMENT & PLAN NOTE
"Patient admits to breakthrough anxiety due to.  Times per week.  Feels an overall feeling of \"fight or flight mode\".  Has been working with her therapist to work on coping mechanisms.  Otherwise doing well on Lexapro 10 mg daily.  Depression controlled.  "

## 2025-02-07 ENCOUNTER — HOSPITAL ENCOUNTER (OUTPATIENT)
Dept: RADIOLOGY | Facility: MEDICAL CENTER | Age: 40
End: 2025-02-07
Attending: OBSTETRICS & GYNECOLOGY
Payer: COMMERCIAL

## 2025-02-07 DIAGNOSIS — R92.8 ABNORMAL MAMMOGRAM: ICD-10-CM

## 2025-02-07 PROCEDURE — G0279 TOMOSYNTHESIS, MAMMO: HCPCS | Mod: LT

## 2025-04-03 ENCOUNTER — APPOINTMENT (OUTPATIENT)
Dept: MEDICAL GROUP | Facility: IMAGING CENTER | Age: 40
End: 2025-04-03
Payer: COMMERCIAL

## 2025-04-03 DIAGNOSIS — Z23 NEED FOR VACCINATION: ICD-10-CM

## 2025-04-03 PROCEDURE — 90471 IMMUNIZATION ADMIN: CPT | Performed by: PHYSICIAN ASSISTANT

## 2025-04-03 PROCEDURE — 99999 PR NO CHARGE: CPT | Performed by: PHYSICIAN ASSISTANT

## 2025-04-03 PROCEDURE — 90651 9VHPV VACCINE 2/3 DOSE IM: CPT | Performed by: PHYSICIAN ASSISTANT

## 2025-04-03 NOTE — PROGRESS NOTES
"Lissette Reilly is a 39 y.o. female here for a non-provider visit for:   HPV 2 of 3    Reason for immunization: continue or complete series started at the office  Immunization records indicate need for vaccine: Yes, confirmed with Epic  Minimum interval has been met for this vaccine: Yes  ABN completed: Yes    VIS Dated  08/23 was given to patient: Yes  All IAC Questionnaire questions were answered \"No.\"    Patient tolerated injection and no adverse effects were observed or reported: Yes    Pt scheduled for next dose in series: Not Indicated  "

## 2025-07-17 DIAGNOSIS — F41.9 ANXIETY: ICD-10-CM

## 2025-07-17 DIAGNOSIS — F33.0 MILD EPISODE OF RECURRENT MAJOR DEPRESSIVE DISORDER (HCC): ICD-10-CM

## 2025-07-17 RX ORDER — ESCITALOPRAM OXALATE 10 MG/1
10 TABLET ORAL DAILY
Qty: 90 TABLET | Refills: 0 | Status: SHIPPED | OUTPATIENT
Start: 2025-07-17

## 2025-07-17 RX ORDER — PROPRANOLOL HYDROCHLORIDE 10 MG/1
10 TABLET ORAL 3 TIMES DAILY PRN
Qty: 30 TABLET | Refills: 0 | Status: SHIPPED | OUTPATIENT
Start: 2025-07-17

## 2025-07-17 NOTE — TELEPHONE ENCOUNTER
Received request via: Patient    Was the patient seen in the last year in this department? Yes    Does the patient have an active prescription (recently filled or refills available) for medication(s) requested? No    Pharmacy Name: [id number]    Does the patient have [facility] Plus and need 100-day supply? (This applies to ALL medications) Patient does not have SCP

## 2025-08-04 ENCOUNTER — APPOINTMENT (OUTPATIENT)
Dept: MEDICAL GROUP | Facility: IMAGING CENTER | Age: 40
End: 2025-08-04
Payer: COMMERCIAL

## 2025-09-04 ENCOUNTER — APPOINTMENT (OUTPATIENT)
Dept: MEDICAL GROUP | Facility: IMAGING CENTER | Age: 40
End: 2025-09-04
Payer: COMMERCIAL